# Patient Record
Sex: MALE | Race: WHITE | Employment: FULL TIME | URBAN - METROPOLITAN AREA
[De-identification: names, ages, dates, MRNs, and addresses within clinical notes are randomized per-mention and may not be internally consistent; named-entity substitution may affect disease eponyms.]

---

## 2017-01-03 ENCOUNTER — HOSPITAL ENCOUNTER (EMERGENCY)
Age: 47
Discharge: HOME | End: 2017-01-03
Attending: EMERGENCY MEDICINE
Payer: COMMERCIAL

## 2017-01-03 VITALS
OXYGEN SATURATION: 97 % | WEIGHT: 260 LBS | HEIGHT: 70 IN | RESPIRATION RATE: 20 BRPM | BODY MASS INDEX: 37.22 KG/M2 | DIASTOLIC BLOOD PRESSURE: 80 MMHG | HEART RATE: 65 BPM | TEMPERATURE: 97.9 F | SYSTOLIC BLOOD PRESSURE: 132 MMHG

## 2017-01-03 DIAGNOSIS — J01.10 ACUTE NON-RECURRENT FRONTAL SINUSITIS: ICD-10-CM

## 2017-01-03 DIAGNOSIS — H60.391 INFECTIVE OTITIS EXTERNA, RIGHT: Primary | ICD-10-CM

## 2017-01-03 PROCEDURE — 99283 EMERGENCY DEPT VISIT LOW MDM: CPT

## 2017-01-03 RX ORDER — CIPROFLOXACIN AND DEXAMETHASONE 3; 1 MG/ML; MG/ML
4 SUSPENSION/ DROPS AURICULAR (OTIC) 2 TIMES DAILY
Qty: 1 BOTTLE | Refills: 0 | Status: SHIPPED | OUTPATIENT
Start: 2017-01-03 | End: 2017-01-10

## 2017-01-03 RX ORDER — AZITHROMYCIN 250 MG/1
TABLET, FILM COATED ORAL
Qty: 6 TABLET | Refills: 0 | Status: SHIPPED | OUTPATIENT
Start: 2017-01-03 | End: 2017-01-13

## 2017-01-03 RX ORDER — HYDROCODONE BITARTRATE AND ACETAMINOPHEN 5; 325 MG/1; MG/1
1 TABLET ORAL EVERY 6 HOURS PRN
Qty: 15 TABLET | Refills: 0 | Status: SHIPPED | OUTPATIENT
Start: 2017-01-03 | End: 2017-03-26 | Stop reason: ALTCHOICE

## 2017-01-03 ASSESSMENT — ENCOUNTER SYMPTOMS
CHEST TIGHTNESS: 0
CHOKING: 0
WHEEZING: 0
ABDOMINAL PAIN: 0
STRIDOR: 0
BLOOD IN STOOL: 0
CONSTIPATION: 0
RHINORRHEA: 1
DIARRHEA: 0
BACK PAIN: 0
COUGH: 0
SHORTNESS OF BREATH: 0
EYE REDNESS: 0
SORE THROAT: 0
VOMITING: 0
FACIAL SWELLING: 0
SINUS PRESSURE: 0
TROUBLE SWALLOWING: 0
EYE DISCHARGE: 0
VOICE CHANGE: 0
EYE PAIN: 0

## 2017-01-03 ASSESSMENT — PAIN SCALES - GENERAL: PAINLEVEL_OUTOF10: 8

## 2017-01-03 ASSESSMENT — PAIN DESCRIPTION - FREQUENCY: FREQUENCY: CONTINUOUS

## 2017-01-03 ASSESSMENT — PAIN DESCRIPTION - ORIENTATION: ORIENTATION: OTHER (COMMENT)

## 2017-01-03 ASSESSMENT — PAIN DESCRIPTION - DESCRIPTORS: DESCRIPTORS: SHARP;THROBBING

## 2017-01-03 ASSESSMENT — PAIN DESCRIPTION - LOCATION: LOCATION: EAR

## 2017-03-26 ENCOUNTER — APPOINTMENT (OUTPATIENT)
Dept: GENERAL RADIOLOGY | Age: 47
End: 2017-03-26
Payer: COMMERCIAL

## 2017-03-26 ENCOUNTER — HOSPITAL ENCOUNTER (EMERGENCY)
Age: 47
Discharge: HOME | End: 2017-03-26
Attending: EMERGENCY MEDICINE
Payer: COMMERCIAL

## 2017-03-26 VITALS
HEART RATE: 70 BPM | RESPIRATION RATE: 17 BRPM | TEMPERATURE: 97.9 F | OXYGEN SATURATION: 96 % | HEIGHT: 70 IN | WEIGHT: 245 LBS | DIASTOLIC BLOOD PRESSURE: 71 MMHG | SYSTOLIC BLOOD PRESSURE: 121 MMHG | BODY MASS INDEX: 35.07 KG/M2

## 2017-03-26 DIAGNOSIS — S46.912A SHOULDER STRAIN, LEFT, INITIAL ENCOUNTER: Primary | ICD-10-CM

## 2017-03-26 PROCEDURE — 6370000000 HC RX 637 (ALT 250 FOR IP): Performed by: EMERGENCY MEDICINE

## 2017-03-26 PROCEDURE — 99283 EMERGENCY DEPT VISIT LOW MDM: CPT

## 2017-03-26 PROCEDURE — 73030 X-RAY EXAM OF SHOULDER: CPT

## 2017-03-26 RX ORDER — LEVOTHYROXINE SODIUM 0.2 MG/1
275 TABLET ORAL DAILY
COMMUNITY

## 2017-03-26 RX ORDER — ALBUTEROL SULFATE 90 UG/1
2 AEROSOL, METERED RESPIRATORY (INHALATION) EVERY 6 HOURS PRN
COMMUNITY

## 2017-03-26 RX ORDER — HYDROCODONE BITARTRATE AND ACETAMINOPHEN 5; 325 MG/1; MG/1
1 TABLET ORAL ONCE
Status: COMPLETED | OUTPATIENT
Start: 2017-03-26 | End: 2017-03-26

## 2017-03-26 RX ORDER — ACETAMINOPHEN 500 MG
500 TABLET ORAL EVERY 6 HOURS PRN
COMMUNITY

## 2017-03-26 RX ORDER — TRAMADOL HYDROCHLORIDE 50 MG/1
50 TABLET ORAL EVERY 6 HOURS PRN
Qty: 12 TABLET | Refills: 0 | Status: SHIPPED | OUTPATIENT
Start: 2017-03-26

## 2017-03-26 RX ADMIN — HYDROCODONE BITARTATE AND ACETAMINOPHEN 1 TABLET: 5; 325 TABLET ORAL at 11:44

## 2017-03-26 ASSESSMENT — PAIN DESCRIPTION - DESCRIPTORS
DESCRIPTORS: ACHING
DESCRIPTORS: ACHING
DESCRIPTORS: ACHING;SORE;SHARP

## 2017-03-26 ASSESSMENT — PAIN DESCRIPTION - LOCATION
LOCATION: SHOULDER

## 2017-03-26 ASSESSMENT — PAIN SCALES - GENERAL
PAINLEVEL_OUTOF10: 7
PAINLEVEL_OUTOF10: 4
PAINLEVEL_OUTOF10: 4
PAINLEVEL_OUTOF10: 8

## 2017-03-26 ASSESSMENT — ENCOUNTER SYMPTOMS
ABDOMINAL PAIN: 0
BACK PAIN: 0
NAUSEA: 0
CHEST TIGHTNESS: 0
VOMITING: 0
RHINORRHEA: 0
TROUBLE SWALLOWING: 0
BLOOD IN STOOL: 0
WHEEZING: 0
COUGH: 0
DIARRHEA: 0
SHORTNESS OF BREATH: 0
EYE PAIN: 0

## 2017-03-26 ASSESSMENT — PAIN DESCRIPTION - ORIENTATION
ORIENTATION: LEFT
ORIENTATION: LEFT

## 2017-03-26 ASSESSMENT — PAIN DESCRIPTION - FREQUENCY
FREQUENCY: INTERMITTENT
FREQUENCY: CONTINUOUS
FREQUENCY: INTERMITTENT

## 2017-03-26 ASSESSMENT — PAIN DESCRIPTION - PROGRESSION: CLINICAL_PROGRESSION: GRADUALLY WORSENING

## 2017-03-26 ASSESSMENT — PAIN DESCRIPTION - PAIN TYPE: TYPE: ACUTE PAIN

## 2017-04-15 ENCOUNTER — HOSPITAL ENCOUNTER (OUTPATIENT)
Dept: MRI IMAGING | Age: 47
Discharge: HOME | End: 2017-04-15
Payer: COMMERCIAL

## 2017-04-15 DIAGNOSIS — R52 PAIN: ICD-10-CM

## 2017-04-15 PROCEDURE — 73221 MRI JOINT UPR EXTREM W/O DYE: CPT

## 2023-02-13 PROBLEM — N32.81 OVERACTIVE BLADDER: Status: ACTIVE | Noted: 2023-02-13

## 2023-02-13 PROBLEM — E78.5 HYPERLIPIDEMIA: Status: ACTIVE | Noted: 2023-02-13

## 2023-02-13 PROBLEM — R94.30 ABNORMAL RESULTS OF CARDIOVASCULAR FUNCTION STUDIES: Status: RESOLVED | Noted: 2023-02-13 | Resolved: 2023-02-13

## 2023-02-13 PROBLEM — G47.33 OBSTRUCTIVE SLEEP APNEA, ADULT: Status: ACTIVE | Noted: 2023-02-13

## 2023-02-13 PROBLEM — N20.0 RENAL CALCULUS, LEFT: Status: ACTIVE | Noted: 2023-02-13

## 2023-02-13 PROBLEM — I25.10 CAD (CORONARY ARTERY DISEASE): Status: ACTIVE | Noted: 2023-02-13

## 2023-02-13 PROBLEM — E03.9 HYPOTHYROID: Status: ACTIVE | Noted: 2023-02-13

## 2023-02-13 PROBLEM — K59.00 CONSTIPATION, ACUTE: Status: ACTIVE | Noted: 2023-02-13

## 2023-02-13 PROBLEM — Z98.61 CAD S/P PERCUTANEOUS CORONARY ANGIOPLASTY: Status: ACTIVE | Noted: 2023-02-13

## 2023-02-13 PROBLEM — L84 FOOT CALLUS: Status: ACTIVE | Noted: 2023-02-13

## 2023-02-13 PROBLEM — J30.9 ALLERGIC RHINITIS: Status: ACTIVE | Noted: 2023-02-13

## 2023-02-13 PROBLEM — R93.1 ABNORMAL ECHOCARDIOGRAM: Status: ACTIVE | Noted: 2023-02-13

## 2023-02-13 PROBLEM — M89.9 OSTEOCHONDRAL LESION: Status: ACTIVE | Noted: 2023-02-13

## 2023-02-13 PROBLEM — R94.30 ABNORMAL RESULTS OF CARDIOVASCULAR FUNCTION STUDIES: Status: ACTIVE | Noted: 2023-02-13

## 2023-02-13 PROBLEM — M51.369 DISC DEGENERATION, LUMBAR: Status: ACTIVE | Noted: 2023-02-13

## 2023-02-13 PROBLEM — I10 ESSENTIAL HYPERTENSION: Status: ACTIVE | Noted: 2023-02-13

## 2023-02-13 PROBLEM — J44.9 COPD (CHRONIC OBSTRUCTIVE PULMONARY DISEASE) (MULTI): Status: ACTIVE | Noted: 2023-02-13

## 2023-02-13 PROBLEM — M77.11 LATERAL EPICONDYLITIS OF RIGHT ELBOW: Status: ACTIVE | Noted: 2023-02-13

## 2023-02-13 PROBLEM — M94.9 OSTEOCHONDRAL LESION: Status: ACTIVE | Noted: 2023-02-13

## 2023-02-13 PROBLEM — I42.9 CARDIOMYOPATHY (MULTI): Status: ACTIVE | Noted: 2023-02-13

## 2023-02-13 PROBLEM — G47.00 INSOMNIA: Status: ACTIVE | Noted: 2023-02-13

## 2023-02-13 PROBLEM — M51.36 DISC DEGENERATION, LUMBAR: Status: ACTIVE | Noted: 2023-02-13

## 2023-02-13 RX ORDER — MIRABEGRON 25 MG/1
25 TABLET, FILM COATED, EXTENDED RELEASE ORAL NIGHTLY
COMMUNITY
End: 2023-10-23 | Stop reason: ALTCHOICE

## 2023-02-13 RX ORDER — CLOPIDOGREL BISULFATE 75 MG/1
75 TABLET ORAL DAILY
COMMUNITY
End: 2024-01-23 | Stop reason: ALTCHOICE

## 2023-02-13 RX ORDER — NITROGLYCERIN 0.4 MG/1
TABLET SUBLINGUAL
COMMUNITY
Start: 2016-08-22

## 2023-02-13 RX ORDER — BUDESONIDE AND FORMOTEROL FUMARATE DIHYDRATE 160; 4.5 UG/1; UG/1
2 AEROSOL RESPIRATORY (INHALATION) 2 TIMES DAILY
COMMUNITY
Start: 2017-01-23 | End: 2023-11-13 | Stop reason: SDUPTHER

## 2023-02-13 RX ORDER — ASPIRIN 81 MG/1
1 TABLET ORAL DAILY
COMMUNITY
Start: 2020-06-08

## 2023-02-13 RX ORDER — LEVOTHYROXINE SODIUM 175 UG/1
1 TABLET ORAL DAILY
COMMUNITY
Start: 2017-01-23 | End: 2023-04-11

## 2023-02-13 RX ORDER — ALBUTEROL SULFATE 90 UG/1
1-2 AEROSOL, METERED RESPIRATORY (INHALATION) 4 TIMES DAILY
COMMUNITY
Start: 2020-07-07 | End: 2023-11-13 | Stop reason: SDUPTHER

## 2023-02-13 RX ORDER — VALSARTAN 80 MG/1
1 TABLET ORAL DAILY
COMMUNITY
Start: 2021-05-27 | End: 2023-04-11 | Stop reason: SDUPTHER

## 2023-02-13 RX ORDER — ISOSORBIDE DINITRATE 30 MG/1
30 TABLET ORAL DAILY
COMMUNITY
End: 2023-10-23 | Stop reason: ALTCHOICE

## 2023-02-13 RX ORDER — CARVEDILOL 12.5 MG/1
12.5 TABLET ORAL 2 TIMES DAILY
COMMUNITY
End: 2023-07-11 | Stop reason: SDUPTHER

## 2023-02-13 RX ORDER — ATORVASTATIN CALCIUM 80 MG/1
1 TABLET, FILM COATED ORAL NIGHTLY
COMMUNITY
Start: 2020-07-07 | End: 2023-11-13 | Stop reason: SDUPTHER

## 2023-03-24 ENCOUNTER — APPOINTMENT (OUTPATIENT)
Dept: PRIMARY CARE | Facility: CLINIC | Age: 53
End: 2023-03-24
Payer: COMMERCIAL

## 2023-04-11 ENCOUNTER — OFFICE VISIT (OUTPATIENT)
Dept: PRIMARY CARE | Facility: CLINIC | Age: 53
End: 2023-04-11
Payer: COMMERCIAL

## 2023-04-11 VITALS
DIASTOLIC BLOOD PRESSURE: 91 MMHG | TEMPERATURE: 97.7 F | SYSTOLIC BLOOD PRESSURE: 145 MMHG | BODY MASS INDEX: 32.44 KG/M2 | WEIGHT: 219 LBS | HEART RATE: 78 BPM | HEIGHT: 69 IN

## 2023-04-11 DIAGNOSIS — E03.9 ACQUIRED HYPOTHYROIDISM: ICD-10-CM

## 2023-04-11 DIAGNOSIS — J43.2 CENTRILOBULAR EMPHYSEMA (MULTI): ICD-10-CM

## 2023-04-11 DIAGNOSIS — I25.10 CAD S/P PERCUTANEOUS CORONARY ANGIOPLASTY: Primary | ICD-10-CM

## 2023-04-11 DIAGNOSIS — I10 ESSENTIAL HYPERTENSION: ICD-10-CM

## 2023-04-11 DIAGNOSIS — N40.0 BENIGN PROSTATIC HYPERPLASIA WITHOUT LOWER URINARY TRACT SYMPTOMS: ICD-10-CM

## 2023-04-11 DIAGNOSIS — E03.9 HYPOTHYROIDISM, UNSPECIFIED TYPE: ICD-10-CM

## 2023-04-11 DIAGNOSIS — I42.8 OTHER CARDIOMYOPATHY (MULTI): ICD-10-CM

## 2023-04-11 DIAGNOSIS — Z98.61 CAD S/P PERCUTANEOUS CORONARY ANGIOPLASTY: Primary | ICD-10-CM

## 2023-04-11 DIAGNOSIS — I71.21 ANEURYSM OF ASCENDING AORTA WITHOUT RUPTURE (CMS-HCC): ICD-10-CM

## 2023-04-11 PROBLEM — J30.9 ALLERGIC RHINITIS: Status: RESOLVED | Noted: 2023-02-13 | Resolved: 2023-04-11

## 2023-04-11 PROBLEM — L84 FOOT CALLUS: Status: RESOLVED | Noted: 2023-02-13 | Resolved: 2023-04-11

## 2023-04-11 PROBLEM — M94.9 OSTEOCHONDRAL LESION: Status: RESOLVED | Noted: 2023-02-13 | Resolved: 2023-04-11

## 2023-04-11 PROBLEM — M89.9 OSTEOCHONDRAL LESION: Status: RESOLVED | Noted: 2023-02-13 | Resolved: 2023-04-11

## 2023-04-11 PROBLEM — M77.11 LATERAL EPICONDYLITIS OF RIGHT ELBOW: Status: RESOLVED | Noted: 2023-02-13 | Resolved: 2023-04-11

## 2023-04-11 PROBLEM — R93.1 ABNORMAL ECHOCARDIOGRAM: Status: RESOLVED | Noted: 2023-02-13 | Resolved: 2023-04-11

## 2023-04-11 PROBLEM — K59.00 CONSTIPATION, ACUTE: Status: RESOLVED | Noted: 2023-02-13 | Resolved: 2023-04-11

## 2023-04-11 PROBLEM — N20.0 RENAL CALCULUS, LEFT: Status: RESOLVED | Noted: 2023-02-13 | Resolved: 2023-04-11

## 2023-04-11 PROCEDURE — 99213 OFFICE O/P EST LOW 20 MIN: CPT | Performed by: INTERNAL MEDICINE

## 2023-04-11 PROCEDURE — 3077F SYST BP >= 140 MM HG: CPT | Performed by: INTERNAL MEDICINE

## 2023-04-11 PROCEDURE — 1036F TOBACCO NON-USER: CPT | Performed by: INTERNAL MEDICINE

## 2023-04-11 PROCEDURE — 3080F DIAST BP >= 90 MM HG: CPT | Performed by: INTERNAL MEDICINE

## 2023-04-11 RX ORDER — LEVOTHYROXINE SODIUM 175 UG/1
TABLET ORAL
Qty: 90 TABLET | Refills: 0 | Status: SHIPPED | OUTPATIENT
Start: 2023-04-11 | End: 2023-08-14

## 2023-04-11 RX ORDER — VALSARTAN 160 MG/1
160 TABLET ORAL DAILY
Qty: 90 TABLET | Refills: 3 | Status: SHIPPED | OUTPATIENT
Start: 2023-04-11 | End: 2023-10-23 | Stop reason: SDUPTHER

## 2023-04-11 ASSESSMENT — ENCOUNTER SYMPTOMS
GASTROINTESTINAL NEGATIVE: 1
SHORTNESS OF BREATH: 1
BACK PAIN: 1
CONSTITUTIONAL NEGATIVE: 1
NEUROLOGICAL NEGATIVE: 1
EYES NEGATIVE: 1
HYPERTENSION: 1
ARTHRALGIAS: 1
BLURRED VISION: 0
SLEEP DISTURBANCE: 1

## 2023-04-11 NOTE — PROGRESS NOTES
"Subjective   Patient ID: Hira Mojica is a 53 y.o. male who presents for Follow-up.    Hypertension  This is a chronic problem. The current episode started more than 1 year ago. The problem is unchanged. The problem is uncontrolled. Associated symptoms include chest pain and shortness of breath. Pertinent negatives include no anxiety or blurred vision. Risk factors for coronary artery disease include male gender and stress. The current treatment provides mild improvement. There are no compliance problems.  Hypertensive end-organ damage includes CAD/MI and heart failure. There is no history of angina, kidney disease or left ventricular hypertrophy. There is no history of chronic renal disease.   Heart Problem  This is a chronic problem. The current episode started more than 1 year ago. The problem occurs intermittently. The problem has been unchanged. Associated symptoms include arthralgias and chest pain. The treatment provided moderate relief.        Review of Systems   Constitutional: Negative.    HENT: Negative.     Eyes: Negative.  Negative for blurred vision.   Respiratory:  Positive for shortness of breath.    Cardiovascular:  Positive for chest pain.   Gastrointestinal: Negative.    Musculoskeletal:  Positive for arthralgias and back pain.   Skin: Negative.    Neurological: Negative.    Psychiatric/Behavioral:  Positive for sleep disturbance.        Objective   BP (!) 145/91 (BP Location: Right arm, Patient Position: Sitting, BP Cuff Size: Adult)   Pulse 78   Temp 36.5 °C (97.7 °F) (Temporal)   Ht 1.74 m (5' 8.5\")   Wt 99.3 kg (219 lb)   BMI 32.81 kg/m²     Physical Exam  Vitals reviewed.   Constitutional:       Appearance: Normal appearance. He is normal weight.   HENT:      Head: Normocephalic.   Eyes:      Conjunctiva/sclera: Conjunctivae normal.   Cardiovascular:      Rate and Rhythm: Normal rate and regular rhythm.      Pulses: Normal pulses.   Pulmonary:      Effort: Pulmonary effort is " normal.      Breath sounds: Normal breath sounds.   Abdominal:      Palpations: Abdomen is soft.   Musculoskeletal:         General: Normal range of motion.      Cervical back: Normal range of motion.   Skin:     General: Skin is warm and dry.   Neurological:      General: No focal deficit present.   Psychiatric:         Mood and Affect: Mood normal.       Assessment/Plan   Problem List Items Addressed This Visit          Respiratory    COPD (chronic obstructive pulmonary disease) (CMS/HCC)       Circulatory    Essential hypertension    Cardiomyopathy (CMS/HCC)    CAD S/P percutaneous coronary angioplasty - Primary       Endocrine/Metabolic    Hypothyroid     Problem List Items Addressed This Visit          Respiratory    COPD (chronic obstructive pulmonary disease) (CMS/HCC)       Circulatory    Essential hypertension    Cardiomyopathy (CMS/HCC)    CAD S/P percutaneous coronary angioplasty - Primary    Aneurysm of ascending aorta without rupture (CMS/HCC)       Endocrine/Metabolic    Hypothyroid   Pt has moderate to severe COPD. It is progressive disease, use of MDI and proper technique discussed, rinse mouth after inhaled corticosteroids. Notify if progressive dyspnea or cough or lethargy, monitor oxygen saturation if possible with home based pulse oximetry. Avoid hospitalization and call us if any flare ups are about to happen, be sure that annual flu vaccine are uptodate and review need for pneumococcal vaccine. Light to moderate low impact exercises are very helpful and deep breathing  exercises are beneficial in chronic lung diseases.   Patients BP readings reviewed and addressed, as we age our arteries turn stiffer and less elastic. Restricting salt consumption and staying physically fit with regular exercise regimen is the only way to keep our vasculature less tonic. Studies have shown that keeping ideal body wt, exercise routine about 140 to 150 minutes a week, eating variety of plant based diet and  drinking plentiful water are quite helpful. Monitor BP twice or once a week at home and bring log to be reviewed by me. Uncontrolled BP has long term consequences including heart failure, myocardial infarction, accelerated atherosclerosis and kidney dysfunction. Therapy reviewed and explained.  Patient has a ER visit, they found that there was a ascending aortic aneurysm 4.2 cm in size, this Mikaela is surveillance, for that his BP readings has to be better, increase valsartan to 160 mg, lifestyle modification needs to be intense, recent echo was reviewed, ejection fraction remains low, no significantly dilated left atria seen, he is compliant with her CPAP mask, he is compliant with his oxygen use at night, his oxygenation needs to be continued on a nightly basis with a supplemental oxygenation, chronic pain persist, medications are reviewed, cardiology follow-up was reviewed, cystic emphysematous changes were seen, he is a former smoker, check TSH 6 time, I hope he is euthyroid clinically, check PSA next time, follow-up in 4 months.

## 2023-04-28 ENCOUNTER — LAB (OUTPATIENT)
Dept: LAB | Facility: LAB | Age: 53
End: 2023-04-28
Payer: COMMERCIAL

## 2023-04-28 DIAGNOSIS — N40.0 BENIGN PROSTATIC HYPERPLASIA WITHOUT LOWER URINARY TRACT SYMPTOMS: ICD-10-CM

## 2023-04-28 DIAGNOSIS — E03.9 ACQUIRED HYPOTHYROIDISM: ICD-10-CM

## 2023-04-28 LAB
PROSTATE SPECIFIC ANTIGEN,SCREEN: 0.37 NG/ML (ref 0–4)
THYROTROPIN (MIU/L) IN SER/PLAS BY DETECTION LIMIT <= 0.05 MIU/L: 7.79 MIU/L (ref 0.44–3.98)

## 2023-04-28 PROCEDURE — 84443 ASSAY THYROID STIM HORMONE: CPT

## 2023-04-28 PROCEDURE — 84153 ASSAY OF PSA TOTAL: CPT

## 2023-04-28 PROCEDURE — 36415 COLL VENOUS BLD VENIPUNCTURE: CPT

## 2023-06-26 LAB
ANION GAP IN SER/PLAS: 10 MMOL/L (ref 10–20)
CALCIUM (MG/DL) IN SER/PLAS: 9.3 MG/DL (ref 8.6–10.3)
CARBON DIOXIDE, TOTAL (MMOL/L) IN SER/PLAS: 28 MMOL/L (ref 21–32)
CHLORIDE (MMOL/L) IN SER/PLAS: 104 MMOL/L (ref 98–107)
CREATININE (MG/DL) IN SER/PLAS: 1.43 MG/DL (ref 0.5–1.3)
ERYTHROCYTE DISTRIBUTION WIDTH (RATIO) BY AUTOMATED COUNT: 13 % (ref 11.5–14.5)
ERYTHROCYTE MEAN CORPUSCULAR HEMOGLOBIN CONCENTRATION (G/DL) BY AUTOMATED: 33.8 G/DL (ref 32–36)
ERYTHROCYTE MEAN CORPUSCULAR VOLUME (FL) BY AUTOMATED COUNT: 106 FL (ref 80–100)
ERYTHROCYTES (10*6/UL) IN BLOOD BY AUTOMATED COUNT: 3.79 X10E12/L (ref 4.5–5.9)
GFR MALE: 59 ML/MIN/1.73M2
GLUCOSE (MG/DL) IN SER/PLAS: 65 MG/DL (ref 74–99)
HEMATOCRIT (%) IN BLOOD BY AUTOMATED COUNT: 40 % (ref 41–52)
HEMOGLOBIN (G/DL) IN BLOOD: 13.5 G/DL (ref 13.5–17.5)
INR IN PPP BY COAGULATION ASSAY: 1.2 (ref 0.9–1.1)
LEUKOCYTES (10*3/UL) IN BLOOD BY AUTOMATED COUNT: 10 X10E9/L (ref 4.4–11.3)
PLATELETS (10*3/UL) IN BLOOD AUTOMATED COUNT: 222 X10E9/L (ref 150–450)
POTASSIUM (MMOL/L) IN SER/PLAS: 4.2 MMOL/L (ref 3.5–5.3)
PROTHROMBIN TIME (PT) IN PPP BY COAGULATION ASSAY: 14 SEC (ref 9.8–12.8)
SODIUM (MMOL/L) IN SER/PLAS: 138 MMOL/L (ref 136–145)
UREA NITROGEN (MG/DL) IN SER/PLAS: 19 MG/DL (ref 6–23)

## 2023-06-29 ENCOUNTER — HOSPITAL ENCOUNTER (OUTPATIENT)
Dept: DATA CONVERSION | Facility: HOSPITAL | Age: 53
End: 2023-06-29
Attending: INTERNAL MEDICINE | Admitting: INTERNAL MEDICINE
Payer: COMMERCIAL

## 2023-06-29 DIAGNOSIS — I50.22 CHRONIC SYSTOLIC (CONGESTIVE) HEART FAILURE (MULTI): ICD-10-CM

## 2023-06-29 DIAGNOSIS — I11.0 HYPERTENSIVE HEART DISEASE WITH HEART FAILURE (MULTI): ICD-10-CM

## 2023-06-29 DIAGNOSIS — E07.9 DISORDER OF THYROID, UNSPECIFIED: ICD-10-CM

## 2023-06-29 DIAGNOSIS — I25.5 ISCHEMIC CARDIOMYOPATHY: ICD-10-CM

## 2023-06-29 DIAGNOSIS — I42.9 CARDIOMYOPATHY, UNSPECIFIED (MULTI): ICD-10-CM

## 2023-06-29 DIAGNOSIS — K21.9 GASTRO-ESOPHAGEAL REFLUX DISEASE WITHOUT ESOPHAGITIS: ICD-10-CM

## 2023-06-29 DIAGNOSIS — R00.1 BRADYCARDIA, UNSPECIFIED: ICD-10-CM

## 2023-06-29 DIAGNOSIS — J44.9 CHRONIC OBSTRUCTIVE PULMONARY DISEASE, UNSPECIFIED (MULTI): ICD-10-CM

## 2023-06-29 DIAGNOSIS — I25.2 OLD MYOCARDIAL INFARCTION: ICD-10-CM

## 2023-06-29 LAB
ATRIAL RATE: 48 BPM
ATRIAL RATE: 60 BPM
P AXIS: 34 DEGREES
P OFFSET: 124 MS
P OFFSET: 188 MS
P ONSET: 101 MS
P ONSET: 128 MS
PR INTERVAL: 170 MS
PR INTERVAL: 220 MS
Q ONSET: 213 MS
Q ONSET: 217 MS
QRS COUNT: 10 BEATS
QRS COUNT: 8 BEATS
QRS DURATION: 102 MS
QRS DURATION: 102 MS
QT INTERVAL: 420 MS
QT INTERVAL: 442 MS
QTC CALCULATION(BAZETT): 394 MS
QTC CALCULATION(BAZETT): 420 MS
QTC FREDERICIA: 410 MS
QTC FREDERICIA: 420 MS
R AXIS: 10 DEGREES
R AXIS: 37 DEGREES
T AXIS: 11 DEGREES
T AXIS: 9 DEGREES
T OFFSET: 427 MS
T OFFSET: 434 MS
VENTRICULAR RATE: 48 BPM
VENTRICULAR RATE: 60 BPM

## 2023-07-11 ENCOUNTER — OFFICE VISIT (OUTPATIENT)
Dept: PRIMARY CARE | Facility: CLINIC | Age: 53
End: 2023-07-11
Payer: COMMERCIAL

## 2023-07-11 VITALS
WEIGHT: 217 LBS | SYSTOLIC BLOOD PRESSURE: 134 MMHG | BODY MASS INDEX: 32.89 KG/M2 | HEIGHT: 68 IN | DIASTOLIC BLOOD PRESSURE: 90 MMHG | HEART RATE: 68 BPM | TEMPERATURE: 97.8 F

## 2023-07-11 DIAGNOSIS — I42.8 OTHER CARDIOMYOPATHY (MULTI): ICD-10-CM

## 2023-07-11 DIAGNOSIS — I10 ESSENTIAL HYPERTENSION: Primary | ICD-10-CM

## 2023-07-11 DIAGNOSIS — I25.10 CAD S/P PERCUTANEOUS CORONARY ANGIOPLASTY: ICD-10-CM

## 2023-07-11 DIAGNOSIS — G47.33 OBSTRUCTIVE SLEEP APNEA, ADULT: ICD-10-CM

## 2023-07-11 DIAGNOSIS — J43.2 CENTRILOBULAR EMPHYSEMA (MULTI): ICD-10-CM

## 2023-07-11 DIAGNOSIS — E03.9 ACQUIRED HYPOTHYROIDISM: ICD-10-CM

## 2023-07-11 DIAGNOSIS — Z98.61 CAD S/P PERCUTANEOUS CORONARY ANGIOPLASTY: ICD-10-CM

## 2023-07-11 PROCEDURE — 99213 OFFICE O/P EST LOW 20 MIN: CPT | Performed by: INTERNAL MEDICINE

## 2023-07-11 PROCEDURE — 3080F DIAST BP >= 90 MM HG: CPT | Performed by: INTERNAL MEDICINE

## 2023-07-11 PROCEDURE — 3075F SYST BP GE 130 - 139MM HG: CPT | Performed by: INTERNAL MEDICINE

## 2023-07-11 PROCEDURE — 1036F TOBACCO NON-USER: CPT | Performed by: INTERNAL MEDICINE

## 2023-07-11 RX ORDER — CARVEDILOL 12.5 MG/1
25 TABLET ORAL 2 TIMES DAILY
Qty: 180 TABLET | Refills: 3 | Status: SHIPPED | OUTPATIENT
Start: 2023-07-11 | End: 2023-11-09 | Stop reason: SDUPTHER

## 2023-07-11 ASSESSMENT — ENCOUNTER SYMPTOMS
CHEST PRESSURE: 0
FATIGUE: 0
EYES NEGATIVE: 1
DIAPHORESIS: 0
NEUROLOGICAL NEGATIVE: 1
CONSTITUTIONAL NEGATIVE: 1
HYPERTENSION: 1
HOARSE VOICE: 1
ABDOMINAL PAIN: 0
WEAKNESS: 0
ARTHRALGIAS: 1
CARDIOVASCULAR NEGATIVE: 1
ANOREXIA: 0
SHORTNESS OF BREATH: 0
EDEMA: 0
WEIGHT LOSS: 0
GASTROINTESTINAL NEGATIVE: 1
RESPIRATORY NEGATIVE: 1

## 2023-07-11 NOTE — PROGRESS NOTES
Subjective   Patient ID: Hira Mojica is a 53 y.o. male who presents for Follow-up (3 mo fu and med refill).    Heart Problem  This is a chronic problem. The current episode started more than 1 year ago. The problem occurs rarely. The problem has been resolved. Associated symptoms include arthralgias. Pertinent negatives include no abdominal pain, anorexia, chest pain, diaphoresis, fatigue or weakness. The treatment provided significant relief.   Congestive Heart Failure  Presents for follow-up visit. Pertinent negatives include no abdominal pain, chest pain, chest pressure, edema, fatigue or shortness of breath. The symptoms have been improving. Compliance with total regimen is %. Compliance problems include adherence to diet.    Thyroid Problem  Presents for follow-up visit. Symptoms include hoarse voice. Patient reports no cold intolerance, diaphoresis, fatigue or weight loss. The symptoms have been stable.   Hypertension  This is a chronic problem. The current episode started more than 1 year ago. The problem is unchanged. The problem is uncontrolled. Pertinent negatives include no anxiety, chest pain or shortness of breath. The current treatment provides mild improvement. Hypertensive end-organ damage includes CAD/MI. There is no history of angina. Identifiable causes of hypertension include sleep apnea and a thyroid problem.        Review of Systems   Constitutional: Negative.  Negative for diaphoresis, fatigue and weight loss.   HENT:  Positive for hoarse voice.    Eyes: Negative.    Respiratory: Negative.  Negative for shortness of breath.    Cardiovascular: Negative.  Negative for chest pain.   Gastrointestinal: Negative.  Negative for abdominal pain and anorexia.   Endocrine: Negative for cold intolerance.   Musculoskeletal:  Positive for arthralgias.   Skin: Negative.    Neurological: Negative.  Negative for weakness.       Objective   /90 (BP Location: Right arm, Patient Position:  "Sitting, BP Cuff Size: Adult)   Pulse 68   Temp 36.6 °C (97.8 °F) (Temporal)   Ht 1.734 m (5' 8.25\")   Wt 98.4 kg (217 lb)   BMI 32.75 kg/m²     Physical Exam  Vitals reviewed.   Constitutional:       Appearance: Normal appearance. He is obese.   HENT:      Head: Normocephalic and atraumatic.   Eyes:      Conjunctiva/sclera: Conjunctivae normal.   Cardiovascular:      Rate and Rhythm: Normal rate and regular rhythm.      Pulses: Normal pulses.   Pulmonary:      Effort: Pulmonary effort is normal.      Breath sounds: Normal breath sounds.   Chest:      Comments: ICD in place  Abdominal:      Palpations: Abdomen is soft.   Musculoskeletal:         General: Normal range of motion.      Cervical back: Normal range of motion.   Skin:     General: Skin is warm and dry.      Findings: Wound present.      Comments: Healed burn rt wrist area   Neurological:      General: No focal deficit present.   Psychiatric:         Mood and Affect: Mood normal.       Assessment/Plan   Problem List Items Addressed This Visit       COPD (chronic obstructive pulmonary disease) (CMS/[facility])    Essential hypertension - Primary    Obstructive sleep apnea, adult    Cardiomyopathy (CMS/[facility])    CAD S/P percutaneous coronary angioplasty   Pt does not have any angina lately, aware of using NTG if needed, aware to notify MD if any new chest pains happens. Compliance is appropriate. Statin therapy reviewed,    Pt has BHAVESH, usage and compliance of CPAP mask reviewed, pt was advised to use it and stay acclimatized to usage of CPAP mask, appropriate and proper sleep related hygiene reviewed and discussed, avoid sedatives and alcohol, avoid supine sleeping. Weight loo always helps to reduce risk and dangers of untreated sleep apnea. BHAVESH is wide spread and undiagnosed. If not able to tolerate mask then inform us and may try alternate masks or proper fitting masks or nasal pillows. Always have humidity for air flow.   Patient was evaluated today, problem " list was reviewed, problems and concerns addressed, Rx list reviewed and updated, lab and tests were noted and reviewed. Life style changes were discussed, always it works better if we eat plant based diet and plenty of fibres and roughage. Consume adequate amount of water and avoid alcohol, light to moderate physical activities and stress reduction are always beneficial for ongoing physical well being. Do not forget to have 6 to 7 hours of sleep regularly and avoid late night darryl screen exposure.  He has ICDDictation #1  MRN:[id number]  [facility]:[id number]    He has ICD placed, ICD wound looks well, he has a ejection fraction of 35% which required ICD and actually he was not aware about functions of ICD or why he has ICD so we went through that, he is compliant with CPAP mask, he is a former smoker, CPAP usage has been reviewed, he has obstructive disease in a moderate format, he has been having intermittent back pain, TSH was somewhat higher last time, TSH will be checked again, interestingly patient has not been any lipid levels done lately, he is on 80 mg of atorvastatin, no angina, he has a 5 PCI's, rest of medications are reviewed, lifestyle modifications, plenty of water consumption, low-fat low-cholesterol diet were reviewed and discussed, he has a burn on the right wrist, ER visit was reviewed about it, it is healing well, follow-up in 4 months.

## 2023-08-14 DIAGNOSIS — E03.9 HYPOTHYROIDISM, UNSPECIFIED TYPE: ICD-10-CM

## 2023-08-14 RX ORDER — LEVOTHYROXINE SODIUM 175 UG/1
TABLET ORAL
Qty: 90 TABLET | Refills: 0 | Status: SHIPPED | OUTPATIENT
Start: 2023-08-14 | End: 2023-11-13 | Stop reason: SDUPTHER

## 2023-08-30 PROBLEM — N45.1 EPIDIDYMITIS: Status: ACTIVE | Noted: 2023-08-30

## 2023-08-30 PROBLEM — Z91.199 NONCOMPLIANCE: Status: ACTIVE | Noted: 2023-08-30

## 2023-08-30 PROBLEM — R10.13 CHRONIC EPIGASTRIC PAIN: Status: ACTIVE | Noted: 2023-08-30

## 2023-08-30 PROBLEM — R93.1 ABNORMAL ECHOCARDIOGRAM: Status: ACTIVE | Noted: 2023-08-30

## 2023-08-30 PROBLEM — M54.10 RADICULITIS: Status: ACTIVE | Noted: 2023-08-30

## 2023-08-30 PROBLEM — M75.122 COMPLETE TEAR OF LEFT ROTATOR CUFF: Status: ACTIVE | Noted: 2023-08-30

## 2023-08-30 PROBLEM — I42.0 ISCHEMIC CONGESTIVE CARDIOMYOPATHY (MULTI): Status: ACTIVE | Noted: 2023-08-30

## 2023-08-30 PROBLEM — K46.9 ABDOMINAL HERNIA: Status: ACTIVE | Noted: 2023-08-30

## 2023-08-30 PROBLEM — N20.0 RENAL CALCULUS, LEFT: Status: ACTIVE | Noted: 2023-08-30

## 2023-08-30 PROBLEM — I25.5 ISCHEMIC CONGESTIVE CARDIOMYOPATHY (MULTI): Status: ACTIVE | Noted: 2023-08-30

## 2023-08-30 PROBLEM — M77.11 LATERAL EPICONDYLITIS OF RIGHT ELBOW: Status: ACTIVE | Noted: 2023-08-30

## 2023-08-30 PROBLEM — I20.9 ANGINA, CLASS IV (CMS-HCC): Status: ACTIVE | Noted: 2023-08-30

## 2023-08-30 PROBLEM — M94.9 OSTEOCHONDRAL LESION: Status: ACTIVE | Noted: 2023-08-30

## 2023-08-30 PROBLEM — N12 PYELONEPHRITIS: Status: ACTIVE | Noted: 2023-08-30

## 2023-08-30 PROBLEM — R06.02 SOB (SHORTNESS OF BREATH) ON EXERTION: Status: ACTIVE | Noted: 2023-08-30

## 2023-08-30 PROBLEM — N41.9 PROSTATITIS: Status: ACTIVE | Noted: 2023-08-30

## 2023-08-30 PROBLEM — R00.1 SINUS BRADYCARDIA: Status: ACTIVE | Noted: 2023-08-30

## 2023-08-30 PROBLEM — I25.2 OLD MI (MYOCARDIAL INFARCTION): Status: ACTIVE | Noted: 2023-08-30

## 2023-08-30 PROBLEM — I71.21 THORACIC ASCENDING AORTIC ANEURYSM (CMS-HCC): Status: ACTIVE | Noted: 2023-08-30

## 2023-08-30 PROBLEM — K57.32 DIVERTICULITIS OF SIGMOID COLON: Status: ACTIVE | Noted: 2023-08-30

## 2023-08-30 PROBLEM — G89.29 CHRONIC EPIGASTRIC PAIN: Status: ACTIVE | Noted: 2023-08-30

## 2023-08-30 PROBLEM — M10.9 GOUT: Status: ACTIVE | Noted: 2023-08-30

## 2023-08-30 PROBLEM — R22.31 LUMP OF RIGHT WRIST: Status: ACTIVE | Noted: 2023-08-30

## 2023-08-30 PROBLEM — H91.90 DECREASED HEARING: Status: ACTIVE | Noted: 2023-08-30

## 2023-08-30 PROBLEM — M89.9 OSTEOCHONDRAL LESION: Status: ACTIVE | Noted: 2023-08-30

## 2023-08-30 PROBLEM — Z95.810 CARDIAC DEFIBRILLATOR IN PLACE: Status: ACTIVE | Noted: 2023-08-30

## 2023-08-30 PROBLEM — R74.8 ELEVATED LIVER ENZYMES: Status: ACTIVE | Noted: 2023-08-30

## 2023-08-30 RX ORDER — FLUTICASONE PROPIONATE 50 UG/1
1 SPRAY, METERED NASAL DAILY
COMMUNITY
Start: 2023-02-22 | End: 2023-10-23 | Stop reason: ALTCHOICE

## 2023-08-30 RX ORDER — DOCUSATE SODIUM 100 MG/1
CAPSULE, LIQUID FILLED ORAL
COMMUNITY
Start: 2018-01-02 | End: 2023-10-23 | Stop reason: ALTCHOICE

## 2023-08-30 RX ORDER — IBUPROFEN 800 MG/1
800 TABLET ORAL EVERY 6 HOURS PRN
COMMUNITY
End: 2023-10-23 | Stop reason: ALTCHOICE

## 2023-08-30 RX ORDER — ACETAMINOPHEN 325 MG/1
2 TABLET ORAL EVERY 4 HOURS PRN
COMMUNITY
Start: 2023-06-29 | End: 2023-10-23 | Stop reason: ALTCHOICE

## 2023-08-30 RX ORDER — ISOSORBIDE MONONITRATE 30 MG/1
1 TABLET, EXTENDED RELEASE ORAL DAILY
COMMUNITY
Start: 2018-01-24

## 2023-08-30 RX ORDER — VALSARTAN 80 MG/1
1 TABLET ORAL DAILY
COMMUNITY
Start: 2021-05-27 | End: 2023-10-23 | Stop reason: ALTCHOICE

## 2023-08-30 RX ORDER — ACETAMINOPHEN 500 MG
500 TABLET ORAL EVERY 6 HOURS PRN
COMMUNITY
End: 2023-10-23 | Stop reason: ALTCHOICE

## 2023-08-30 RX ORDER — IPRATROPIUM BROMIDE AND ALBUTEROL SULFATE 2.5; .5 MG/3ML; MG/3ML
SOLUTION RESPIRATORY (INHALATION)
COMMUNITY
Start: 2017-12-08 | End: 2024-01-23 | Stop reason: WASHOUT

## 2023-08-30 RX ORDER — ATENOLOL 50 MG/1
1 TABLET ORAL DAILY
COMMUNITY
Start: 2012-04-20 | End: 2023-10-23 | Stop reason: ALTCHOICE

## 2023-08-30 RX ORDER — LEVOTHYROXINE SODIUM 200 UG/1
1 TABLET ORAL DAILY
COMMUNITY
Start: 2012-04-20 | End: 2023-10-23 | Stop reason: ALTCHOICE

## 2023-08-30 RX ORDER — PHENAZOPYRIDINE HYDROCHLORIDE 200 MG/1
TABLET, FILM COATED ORAL
COMMUNITY
Start: 2018-01-26 | End: 2023-10-23 | Stop reason: ALTCHOICE

## 2023-08-30 RX ORDER — TRAMADOL HYDROCHLORIDE 50 MG/1
1 TABLET ORAL EVERY 6 HOURS PRN
COMMUNITY
Start: 2017-03-26 | End: 2023-10-23 | Stop reason: ALTCHOICE

## 2023-09-29 VITALS — WEIGHT: 216.05 LBS | HEIGHT: 68 IN | BODY MASS INDEX: 32.74 KG/M2

## 2023-09-30 NOTE — H&P
History of Present Illness:   History Present Illness:  Reason for surgery: cardiomyopathy, bradycardia   HPI:    He is here for ICD implant.  He has chronic systolic heart failure, cardiomyopathy, and bradycardia.      Shared decision making performed. [place] decision tool.  All questions answered.  He confirms consent for ICD implant.    Past Medical History:        Medical History:   SOB (shortness of breath) on exertion: Onset Date: 23-Nov-2022   HLD (hyperlipidemia):    HTN (hypertension):    CAD (coronary artery disease):     Allergies:        Allergies:  ·  Toradol : Other, Rash, Throat Swelling  ·  penicillin : Throat Swelling, Other, Rash    Home Medication Review:   Home Medications Reviewed: yes     Impression/Procedure:   ·  Impression and Planned Procedure: cardiomyopathy, bradycardia; for dual chamber ICD       ERAS (Enhanced Recovery After Surgery):  ·  ERAS Patient: no     Review of Systems:   Review of Systems:  Constitutional: NEGATIVE: Fever, Chills     Respiratory: NEGATIVE: Dry Cough, Productive Cough     Cardiac: POSITIVE: Dyspnea on Exertion; NEGATIVE:  Chest Pain, Orthopnea, Palpitations, Syncope     All Other Systems: All other systems reviewed and  are negative       Physical Exam Narrative:  ·  Physical Exam:    VS noted    Physical Exam by System:    Constitutional: Well developed, awake/alert/oriented  x3, no distress, alert and cooperative   Eyes: EOMI, clear sclera   ENMT: mucous membranes moist, no apparent injury,  no lesions seen   Head/Neck: Neck supple, no thyromegaly, No JVD, trachea  midline   Respiratory/Thorax: Patent airways, CTA bilaterally,  no wheezing, normal breath sounds with good chest expansion, thorax symmetric   Cardiovascular: Laterally displaced PMI, regular,  rate and rhythm, , normal S 1and S 2, no murmurs, 2+ equal pulses radial, brachial, DP pulses   Gastrointestinal: Nondistended, BS +,  no bruits,  soft, non-tender, no guarding, no masses palpable, no  organomegaly   Genitourinary: deferred   Musculoskeletal: ROM intact, no joint swelling, normal  strength   Extremities: normal extremities; no cyanosis, edema,  or contusions, no clubbing   Neurological: alert and oriented x3, intact senses,  motor normal strength; normal gait   Breast: deferred   Lymphatic: No cervical lymphadenopathy   Psychological: Appropriate mood and behavior   Skin: Warm and dry, no lesions, no rashes     Airway/Sedation Assessment:  ·  Emotional Status calm   ·  Neurologic alert & oriented x 3   ·  Respiratory clear to auscultation   ·  Cardiovascular rhythm & rate regular  laterally displaced PMI   ·  GI/ soft, nontender     · Pulses present: Pedal Left, Pedal Right, Radial Left, Radial Right     ·  Mouth Opening OK yes   ·  Neck Flexibility OK yes   ·  Loose Teeth no   ·  Oropharyngeal Classification Class II   ·  ASA PS Classification ASA III   ·  Sedation Plan moderate sedation     Consent:   COVID-19 Consent:  ·  COVID-19 Risk Consent Surgeon has reviewed key risks related to the risk of brett COVID-19 and if they contract COVID-19 what the risks are.       Electronic Signatures:  Lisa Hurst)  (Signed 29-Jun-2023 09:14)   Authored: History of Present Illness, Past Medical History,  Allergies, Home Medication Review, Impression/Procedure, ERAS, Review of Systems, Physical Exam, Consent, Note Completion      Last Updated: 29-Jun-2023 09:14 by Lisa Hurst)

## 2023-10-06 ENCOUNTER — APPOINTMENT (OUTPATIENT)
Dept: CARDIOLOGY | Facility: HOSPITAL | Age: 53
End: 2023-10-06
Payer: COMMERCIAL

## 2023-10-23 ENCOUNTER — OFFICE VISIT (OUTPATIENT)
Dept: CARDIOLOGY | Facility: CLINIC | Age: 53
End: 2023-10-23
Payer: COMMERCIAL

## 2023-10-23 ENCOUNTER — HOSPITAL ENCOUNTER (OUTPATIENT)
Dept: RADIOLOGY | Facility: HOSPITAL | Age: 53
Discharge: HOME | End: 2023-10-23
Payer: COMMERCIAL

## 2023-10-23 ENCOUNTER — HOSPITAL ENCOUNTER (OUTPATIENT)
Dept: CARDIOLOGY | Facility: HOSPITAL | Age: 53
Discharge: HOME | End: 2023-10-23
Payer: COMMERCIAL

## 2023-10-23 VITALS
WEIGHT: 220 LBS | BODY MASS INDEX: 31.5 KG/M2 | HEIGHT: 70 IN | DIASTOLIC BLOOD PRESSURE: 64 MMHG | HEART RATE: 60 BPM | SYSTOLIC BLOOD PRESSURE: 102 MMHG

## 2023-10-23 DIAGNOSIS — Z98.61 CAD S/P PERCUTANEOUS CORONARY ANGIOPLASTY: ICD-10-CM

## 2023-10-23 DIAGNOSIS — E78.2 MIXED HYPERLIPIDEMIA: ICD-10-CM

## 2023-10-23 DIAGNOSIS — J43.2 CENTRILOBULAR EMPHYSEMA (MULTI): ICD-10-CM

## 2023-10-23 DIAGNOSIS — I47.29 PAROXYSMAL VENTRICULAR TACHYCARDIA (MULTI): ICD-10-CM

## 2023-10-23 DIAGNOSIS — Z95.810 PRESENCE OF AUTOMATIC CARDIOVERTER/DEFIBRILLATOR (AICD): ICD-10-CM

## 2023-10-23 DIAGNOSIS — Z95.810 PRESENCE OF AUTOMATIC (IMPLANTABLE) CARDIAC DEFIBRILLATOR: ICD-10-CM

## 2023-10-23 DIAGNOSIS — G47.33 OBSTRUCTIVE SLEEP APNEA, ADULT: ICD-10-CM

## 2023-10-23 DIAGNOSIS — Z95.810 CARDIAC DEFIBRILLATOR IN PLACE: Primary | ICD-10-CM

## 2023-10-23 DIAGNOSIS — I42.8 OTHER CARDIOMYOPATHY (MULTI): ICD-10-CM

## 2023-10-23 DIAGNOSIS — I42.0 ISCHEMIC CONGESTIVE CARDIOMYOPATHY (MULTI): ICD-10-CM

## 2023-10-23 DIAGNOSIS — I71.21 ANEURYSM OF ASCENDING AORTA WITHOUT RUPTURE (CMS-HCC): ICD-10-CM

## 2023-10-23 DIAGNOSIS — E03.9 ACQUIRED HYPOTHYROIDISM: ICD-10-CM

## 2023-10-23 DIAGNOSIS — I42.9 CARDIOMYOPATHY, UNSPECIFIED (MULTI): ICD-10-CM

## 2023-10-23 DIAGNOSIS — I10 ESSENTIAL HYPERTENSION: ICD-10-CM

## 2023-10-23 DIAGNOSIS — R06.02 SOB (SHORTNESS OF BREATH) ON EXERTION: ICD-10-CM

## 2023-10-23 DIAGNOSIS — I25.10 CAD S/P PERCUTANEOUS CORONARY ANGIOPLASTY: ICD-10-CM

## 2023-10-23 DIAGNOSIS — I25.5 ISCHEMIC CONGESTIVE CARDIOMYOPATHY (MULTI): ICD-10-CM

## 2023-10-23 PROCEDURE — 3078F DIAST BP <80 MM HG: CPT | Performed by: NURSE PRACTITIONER

## 2023-10-23 PROCEDURE — 99214 OFFICE O/P EST MOD 30 MIN: CPT | Performed by: NURSE PRACTITIONER

## 2023-10-23 PROCEDURE — 3074F SYST BP LT 130 MM HG: CPT | Performed by: NURSE PRACTITIONER

## 2023-10-23 PROCEDURE — 93290 INTERROG DEV EVAL ICPMS IP: CPT

## 2023-10-23 PROCEDURE — 93283 PRGRMG EVAL IMPLANTABLE DFB: CPT | Performed by: INTERNAL MEDICINE

## 2023-10-23 PROCEDURE — 1036F TOBACCO NON-USER: CPT | Performed by: NURSE PRACTITIONER

## 2023-10-23 PROCEDURE — 71046 X-RAY EXAM CHEST 2 VIEWS: CPT | Mod: FY

## 2023-10-23 PROCEDURE — 71046 X-RAY EXAM CHEST 2 VIEWS: CPT | Performed by: RADIOLOGY

## 2023-10-23 RX ORDER — VALSARTAN 160 MG/1
160 TABLET ORAL DAILY
Qty: 140 TABLET | Refills: 3 | Status: SHIPPED | OUTPATIENT
Start: 2023-10-23 | End: 2023-11-13 | Stop reason: SDUPTHER

## 2023-10-23 NOTE — PROGRESS NOTES
Electric blanket he states that over amplifies anything that got a magnetic field he went keep in arms length away device in the microwave to turn the microwave on one half CARDIOLOGY OFFICE VISIT      CHIEF COMPLAINT  Chief Complaint   Patient presents with    Device Check     Routine check up       HISTORY OF PRESENT ILLNESS  HPI  The patient is a 53-year-old  male who is followed for ischemic cardiomyopathy with a left ventricular ejection fraction of 30 to 35% per 2D echocardiogram dated April 3, 2023, New York Heart Association class II, stage B heart failure, coronary artery disease status post remote multivessel angioplasty with stent deployment with left heart catheterization dated January 16, 2023 revealing luminal irregularities of the LAD and circumflex with patent stents in both vessels and 80% proximal RCA stenosis status post PTCA with drug-eluting stent.  He underwent implantation of a dual-chamber ICD on June 29, 2023 for primary prevention of sudden cardiac death and presents to the office today for evaluation.  He states since undergoing ICD implant he is less short of breath.  He still occasionally experiences chest pressure with activity and has taken nitroglycerin 1 tablet sublingually on 2 occasions with relief of chest pain.  The patient reports that his blood pressures are not well controlled with home readings as high as 180/100 mmHg.  He reports that he is taking his medications as prescribed.  He states he occasionally has swelling in his feet and ankles which is resolved with elevating the extremities.  Review of medical records reveals the patient was evaluated in the emergency room at [facility] on August 1, 2023 for abdominal pain.  CT scan revealed sigmoid diverticulitis.  Most recently he was evaluated on September 29, 2023 for right ankle pain which is resolved.  Past Medical History  History reviewed. No pertinent past medical history.    Social  History  Social History     Tobacco Use    Smoking status: Former     Types: Cigarettes    Smokeless tobacco: Never   Vaping Use    Vaping Use: Never used   Substance Use Topics    Alcohol use: Never    Drug use: Never       Family History     Family History   Problem Relation Name Age of Onset    Other (arteriosclerotic CVD) Mother      Other (cardiac disorder) Mother      Stroke Mother      Cancer Mother      Colon cancer Mother          had colonectomy which seemed to stop the spread    Other (arteriosclerotic CVD) Father      Stroke Father      Cancer Father      Other (arteriosclerotic CVD) Brother      Other (cardiac disorder) Other grandmother     Colon cancer Other grandmother         was too late for any remedies and passed away within a month of dx    Cancer Other aunt         Allergies:  Allergies   Allergen Reactions    Penicillins Hives    Ketorolac Other     Adverse reaction        Outpatient Medications:  Current Outpatient Medications   Medication Instructions    albuterol 90 mcg/actuation inhaler 1-2 puffs, inhalation, 4 times daily    aspirin 81 mg EC tablet 1 tablet, oral, Daily    atorvastatin (Lipitor) 80 mg tablet 1 tablet, oral, Nightly    budesonide-formoteroL (Symbicort) 160-4.5 mcg/actuation inhaler 2 puffs, inhalation, 2 times daily, RINSE MOUTH AFTER USE.    carvedilol (COREG) 25 mg, oral, 2 times daily    clopidogrel (PLAVIX) 75 mg, oral, Daily    ipratropium-albuteroL (Duo-Neb) 0.5-2.5 mg/3 mL nebulizer solution     isosorbide mononitrate ER (Imdur) 30 mg 24 hr tablet 1 tablet, oral, Daily, In morning    levothyroxine (Synthroid, Levoxyl) 175 mcg tablet Take 1 tablet by mouth once daily    nitroglycerin (Nitrostat) 0.4 mg SL tablet sublingual, PLACE 1 TABLET UNDER THE TONGUE EVERY 5 MINUTES FOR UP TO 3 DOSES AS NEEDED FOR CHEST PAIN.CALL 911 IF PAIN PERSISTS.    oxygen (O2) gas therapy 3 L NC at HS    valsartan (DIOVAN) 160 mg, oral, Daily          REVIEW OF SYSTEMS  Review of Systems    All other systems reviewed and are negative.        VITALS  Vitals:    10/23/23 1033   BP: 102/64   Pulse: 60       PHYSICAL EXAM  Vitals and nursing note reviewed.   Constitutional:       Appearance: Normal appearance.   HENT:      Head: Normocephalic.   Neck:      Vascular: No JVD.   Cardiovascular:      Rate and Rhythm: Normal rate and regular rhythm.      Pulses: Normal pulses.      Heart sounds: Normal heart sounds.   Pulmonary:      Effort: Pulmonary effort is normal.      Breath sounds: Normal breath sounds.   Abdominal:      General: Bowel sounds are normal.      Palpations: Abdomen is soft.   Musculoskeletal:         General: Normal range of motion.      Cervical back: Normal range of motion.   Skin:     General: Skin is warm and dry.  Left subclavian ICD pocket is well-healed without redness swelling or drainage.  Neurological:      General: No focal deficit present.      Mental Status: She is alert and oriented to person, place, and time.      Motor: Motor function is intact.   Psychiatric:         Attention and Perception: Attention and perception normal.         Mood and Affect: Mood and affect normal.         Speech: Speech normal.         Behavior: Behavior normal. Behavior is cooperative.         Thought Content: Thought content normal.         Cognition and Memory: Cognition and memory normal.        Labs and testing: Twelve-lead EKG reveals atrial pacing and ventricular tracking at 60 bpm.  QRS durations 102 ms,  ms, QTc 416 ms.  ICD interrogation dated October 23, 2023 reveals atrial pacing 43% and ventricular pacing 0.93%.  No AT or AF episodes were noted per device counters.  3 SVT and 24 nonsustained VT detections were noted.  2 intracardiac electrograms were provided of the nonsustained VT.  The electrograms reveal atrial tachycardia with one-to-one conduction with abrupt onset and offset.  The remaining 22 nonsustained detections were not available for review at the time of today's  office visit.  PA and lateral chest x-ray reveals good right atrial and right ventricular lead positions with no active lung disease.    ASSESSMENT AND PLAN    Clinical impressions:  1.  Ischemic cardiomyopathy with a left ventricular ejection fraction of 30 to 35% per 2D echocardiogram dated April 3, 2023, New York Heart Association class II, stage B heart failure.  2.  Coronary artery disease status post multivessel angioplasty with stent deployment with left heart catheterization dated January 16, 2023 revealing luminal irregularities of the LAD and circumflex with patent stents in the LAD and circumflex and 80% proximal RCA stenosis status post PTCA with drug-eluting stent.  3.  Dual-chamber ICD implant ([employer] Gall[employer] DR) on June 29, 2023 for primary prevention of sudden cardiac death.  4.  Hypertension, controlled with a blood pressure today of 102/64.  5.  Ascending aortic aneurysm undergoing ongoing monitoring.  6.  Mixed dyslipidemia on statin.  7.  Hypothyroidism on replacement therapy.  8.  Chronic obstructive pulmonary disease.  9.  Class I obesity with a BMI of 31.57.  10.  History of sigmoid diverticulitis.    Recommendations:  1.  Continue current medications as prescribed.  Patient was instructed to monitor his blood pressure twice a day and take an extra valsartan 160 mg for blood pressure greater than 140/80 daily as needed.  2.  Patient was instructed regarding dietary restrictions including salt restrictions to control blood pressure.  He was also encouraged to increase water consumption to 64 ounces per day.  We discussed refraining from canned or frozen vegetables and to rinse those vegetables prior to cooking to reduce the sodium content.  We also discussed refraining from fast foods and fried foods.  3.  Obtain ICD checks as scheduled.  The next check is a remote check on January 23, 2024.  The patient will undergo an in clinic check in 6 months prior to the office visit with   Aris.  4.  Follow-up in office with Dr. Hurst in 6 months or sooner if needed.  5.  The results of the PA and lateral chest x-ray were reviewed with the patient.  6.  Discussed routine device follow up is scheduled every 3-4 months.  Inclinic checks alternate with remote (home) checks.  Inclinic checks will be scheduled prior to the office visit with Electrophysiology.  7.  Instructed patient to always carry the device card in their wallet.  No wanding of the device at the airport or courthouse.  Do not carry cell phone in the shirt pocket on the side of the implant.  Utilize the ear on the opposite side of the device.  Refrain from environments with electromagnetic interference (ELE).  8.  Follow-up in office with Dr. Trimble on January 23, 2024 at 11 AM as scheduled or sooner if needed.    Evaluation and note by Britney Villanueva CNP  **Please excuse any errors in grammar or translation related to this dictation.  Voice recognition software was utilized to prepare this document.**

## 2023-10-23 NOTE — PATIENT INSTRUCTIONS
Check your blood pressure twice a day.  If BP is greater than 140/80 take an extra Valsartan 160mg.  Call Britney at [phone] in 2 weeks with the high, average and low readings.

## 2023-10-27 ENCOUNTER — HOSPITAL ENCOUNTER (OUTPATIENT)
Dept: CARDIOLOGY | Facility: HOSPITAL | Age: 53
Discharge: HOME | End: 2023-10-27
Payer: COMMERCIAL

## 2023-10-27 DIAGNOSIS — Z95.810 PRESENCE OF AUTOMATIC CARDIOVERTER/DEFIBRILLATOR (AICD): Primary | ICD-10-CM

## 2023-10-27 DIAGNOSIS — I47.29 PAROXYSMAL VENTRICULAR TACHYCARDIA (MULTI): ICD-10-CM

## 2023-10-27 DIAGNOSIS — Z95.810 PRESENCE OF AUTOMATIC CARDIOVERTER/DEFIBRILLATOR (AICD): ICD-10-CM

## 2023-10-30 ENCOUNTER — HOSPITAL ENCOUNTER (OUTPATIENT)
Dept: CARDIOLOGY | Facility: HOSPITAL | Age: 53
Discharge: HOME | End: 2023-10-30
Payer: COMMERCIAL

## 2023-10-30 DIAGNOSIS — Z95.810 PRESENCE OF AUTOMATIC CARDIOVERTER/DEFIBRILLATOR (AICD): ICD-10-CM

## 2023-10-30 DIAGNOSIS — I47.29 PAROXYSMAL VENTRICULAR TACHYCARDIA (MULTI): ICD-10-CM

## 2023-11-01 ENCOUNTER — TELEPHONE (OUTPATIENT)
Dept: CARDIOLOGY | Facility: CLINIC | Age: 53
End: 2023-11-01
Payer: COMMERCIAL

## 2023-11-01 NOTE — TELEPHONE ENCOUNTER
----- Message from Lisa Hurst MD sent at 10/25/2023  6:57 AM EDT -----  Regarding: FW:  Cxr reviewed.  Call pt that cxr is ok  ----- Message -----  From: Interface, Radiology Results In  Sent: 10/25/2023   1:57 AM EDT  To: Lisa Hurst MD

## 2023-11-02 ENCOUNTER — TELEPHONE (OUTPATIENT)
Dept: CARDIOLOGY | Facility: CLINIC | Age: 53
End: 2023-11-02
Payer: COMMERCIAL

## 2023-11-02 DIAGNOSIS — R00.2 PALPITATIONS: Primary | ICD-10-CM

## 2023-11-02 DIAGNOSIS — I47.29 PAROXYSMAL VENTRICULAR TACHYCARDIA (MULTI): ICD-10-CM

## 2023-11-02 NOTE — TELEPHONE ENCOUNTER
Pt left message that he missed a call from the office today.    Note in chart but no contents.      Please follow up.    Patient left a voice mail for Britney Villanueva CNP stating that when he takes his blood pressure, it has been showing some abnormal heart beats. Called and spoke to patient, he reports that he does get a little fluttering in his chest a couple of times weekly. Remote check obtained and reviewed by Britney Villanueva CNP.  Per Britney Villanueva CNP remote shows 1 NSVT , egm shows SVT. Per Britney Villanueva CNP  patient to add Mag-ox 400mg once daily to his medications. Patient to keep a record of the events that he sees on his blood pressure monitor with date and time along with any symptoms that he is having, so that his information can be compared to remote check. Patient verbalized understanding

## 2023-11-03 RX ORDER — CALCIUM CARBONATE/VITAMIN D3 500-10/5ML
400 LIQUID (ML) ORAL DAILY
Qty: 90 CAPSULE | Refills: 0 | Status: SHIPPED | OUTPATIENT
Start: 2023-11-03 | End: 2024-03-26 | Stop reason: SDUPTHER

## 2023-11-03 RX ORDER — LANOLIN ALCOHOL/MO/W.PET/CERES
400 CREAM (GRAM) TOPICAL ONCE
Status: CANCELLED | OUTPATIENT
Start: 2023-11-03 | End: 2023-11-03

## 2023-11-08 ENCOUNTER — TELEPHONE (OUTPATIENT)
Dept: CARDIOLOGY | Facility: CLINIC | Age: 53
End: 2023-11-08
Payer: COMMERCIAL

## 2023-11-08 ENCOUNTER — HOSPITAL ENCOUNTER (EMERGENCY)
Facility: HOSPITAL | Age: 53
Discharge: HOME | End: 2023-11-08
Attending: STUDENT IN AN ORGANIZED HEALTH CARE EDUCATION/TRAINING PROGRAM
Payer: COMMERCIAL

## 2023-11-08 ENCOUNTER — APPOINTMENT (OUTPATIENT)
Dept: CARDIOLOGY | Facility: HOSPITAL | Age: 53
End: 2023-11-08
Payer: COMMERCIAL

## 2023-11-08 VITALS
BODY MASS INDEX: 30.06 KG/M2 | WEIGHT: 210 LBS | RESPIRATION RATE: 18 BRPM | HEART RATE: 61 BPM | SYSTOLIC BLOOD PRESSURE: 150 MMHG | OXYGEN SATURATION: 98 % | HEIGHT: 70 IN | DIASTOLIC BLOOD PRESSURE: 95 MMHG | TEMPERATURE: 97.7 F

## 2023-11-08 DIAGNOSIS — I42.8 OTHER CARDIOMYOPATHY (MULTI): ICD-10-CM

## 2023-11-08 DIAGNOSIS — Z98.61 CAD S/P PERCUTANEOUS CORONARY ANGIOPLASTY: ICD-10-CM

## 2023-11-08 DIAGNOSIS — I15.9 SECONDARY HYPERTENSION: Primary | ICD-10-CM

## 2023-11-08 DIAGNOSIS — I25.10 CAD S/P PERCUTANEOUS CORONARY ANGIOPLASTY: ICD-10-CM

## 2023-11-08 LAB
ANION GAP SERPL CALC-SCNC: 9 MMOL/L (ref 10–20)
BASOPHILS # BLD AUTO: 0.07 X10*3/UL (ref 0–0.1)
BASOPHILS NFR BLD AUTO: 0.9 %
BNP SERPL-MCNC: 97 PG/ML (ref 0–99)
BUN SERPL-MCNC: 18 MG/DL (ref 6–23)
CALCIUM SERPL-MCNC: 9.4 MG/DL (ref 8.6–10.3)
CARDIAC TROPONIN I PNL SERPL HS: 13 NG/L (ref 0–20)
CHLORIDE SERPL-SCNC: 104 MMOL/L (ref 98–107)
CO2 SERPL-SCNC: 30 MMOL/L (ref 21–32)
CREAT SERPL-MCNC: 1.12 MG/DL (ref 0.5–1.3)
EOSINOPHIL # BLD AUTO: 0.22 X10*3/UL (ref 0–0.7)
EOSINOPHIL NFR BLD AUTO: 2.9 %
ERYTHROCYTE [DISTWIDTH] IN BLOOD BY AUTOMATED COUNT: 13.2 % (ref 11.5–14.5)
FLUAV RNA RESP QL NAA+PROBE: NOT DETECTED
FLUBV RNA RESP QL NAA+PROBE: NOT DETECTED
GFR SERPL CREATININE-BSD FRML MDRD: 79 ML/MIN/1.73M*2
GLUCOSE SERPL-MCNC: 84 MG/DL (ref 74–99)
HCT VFR BLD AUTO: 39.3 % (ref 41–52)
HGB BLD-MCNC: 13.4 G/DL (ref 13.5–17.5)
HOLD SPECIMEN: NORMAL
HOLD SPECIMEN: NORMAL
IMM GRANULOCYTES # BLD AUTO: 0.01 X10*3/UL (ref 0–0.7)
IMM GRANULOCYTES NFR BLD AUTO: 0.1 % (ref 0–0.9)
LYMPHOCYTES # BLD AUTO: 1.96 X10*3/UL (ref 1.2–4.8)
LYMPHOCYTES NFR BLD AUTO: 25.7 %
MAGNESIUM SERPL-MCNC: 2.06 MG/DL (ref 1.6–2.4)
MCH RBC QN AUTO: 34.6 PG (ref 26–34)
MCHC RBC AUTO-ENTMCNC: 34.1 G/DL (ref 32–36)
MCV RBC AUTO: 102 FL (ref 80–100)
MONOCYTES # BLD AUTO: 0.81 X10*3/UL (ref 0.1–1)
MONOCYTES NFR BLD AUTO: 10.6 %
NEUTROPHILS # BLD AUTO: 4.56 X10*3/UL (ref 1.2–7.7)
NEUTROPHILS NFR BLD AUTO: 59.8 %
NRBC BLD-RTO: 0 /100 WBCS (ref 0–0)
PLATELET # BLD AUTO: 192 X10*3/UL (ref 150–450)
POTASSIUM SERPL-SCNC: 4 MMOL/L (ref 3.5–5.3)
RBC # BLD AUTO: 3.87 X10*6/UL (ref 4.5–5.9)
SARS-COV-2 RNA RESP QL NAA+PROBE: NOT DETECTED
SODIUM SERPL-SCNC: 139 MMOL/L (ref 136–145)
WBC # BLD AUTO: 7.6 X10*3/UL (ref 4.4–11.3)

## 2023-11-08 PROCEDURE — 93005 ELECTROCARDIOGRAM TRACING: CPT

## 2023-11-08 PROCEDURE — 99285 EMERGENCY DEPT VISIT HI MDM: CPT | Mod: 25 | Performed by: STUDENT IN AN ORGANIZED HEALTH CARE EDUCATION/TRAINING PROGRAM

## 2023-11-08 PROCEDURE — 87636 SARSCOV2 & INF A&B AMP PRB: CPT | Performed by: STUDENT IN AN ORGANIZED HEALTH CARE EDUCATION/TRAINING PROGRAM

## 2023-11-08 PROCEDURE — 99284 EMERGENCY DEPT VISIT MOD MDM: CPT | Mod: 25

## 2023-11-08 PROCEDURE — 96375 TX/PRO/DX INJ NEW DRUG ADDON: CPT

## 2023-11-08 PROCEDURE — 36415 COLL VENOUS BLD VENIPUNCTURE: CPT | Performed by: STUDENT IN AN ORGANIZED HEALTH CARE EDUCATION/TRAINING PROGRAM

## 2023-11-08 PROCEDURE — 94760 N-INVAS EAR/PLS OXIMETRY 1: CPT

## 2023-11-08 PROCEDURE — 2500000004 HC RX 250 GENERAL PHARMACY W/ HCPCS (ALT 636 FOR OP/ED): Performed by: STUDENT IN AN ORGANIZED HEALTH CARE EDUCATION/TRAINING PROGRAM

## 2023-11-08 PROCEDURE — 84484 ASSAY OF TROPONIN QUANT: CPT | Performed by: STUDENT IN AN ORGANIZED HEALTH CARE EDUCATION/TRAINING PROGRAM

## 2023-11-08 PROCEDURE — 2500000001 HC RX 250 WO HCPCS SELF ADMINISTERED DRUGS (ALT 637 FOR MEDICARE OP): Performed by: STUDENT IN AN ORGANIZED HEALTH CARE EDUCATION/TRAINING PROGRAM

## 2023-11-08 PROCEDURE — 84132 ASSAY OF SERUM POTASSIUM: CPT | Performed by: STUDENT IN AN ORGANIZED HEALTH CARE EDUCATION/TRAINING PROGRAM

## 2023-11-08 PROCEDURE — 85025 COMPLETE CBC W/AUTO DIFF WBC: CPT | Performed by: STUDENT IN AN ORGANIZED HEALTH CARE EDUCATION/TRAINING PROGRAM

## 2023-11-08 PROCEDURE — 83880 ASSAY OF NATRIURETIC PEPTIDE: CPT | Performed by: STUDENT IN AN ORGANIZED HEALTH CARE EDUCATION/TRAINING PROGRAM

## 2023-11-08 PROCEDURE — 83735 ASSAY OF MAGNESIUM: CPT | Performed by: STUDENT IN AN ORGANIZED HEALTH CARE EDUCATION/TRAINING PROGRAM

## 2023-11-08 PROCEDURE — 96365 THER/PROPH/DIAG IV INF INIT: CPT

## 2023-11-08 RX ORDER — BUTALBITAL, ACETAMINOPHEN AND CAFFEINE 50; 325; 40 MG/1; MG/1; MG/1
2 TABLET ORAL ONCE
Status: COMPLETED | OUTPATIENT
Start: 2023-11-08 | End: 2023-11-08

## 2023-11-08 RX ORDER — PROMETHAZINE HYDROCHLORIDE 25 MG/ML
INJECTION, SOLUTION INTRAMUSCULAR; INTRAVENOUS
Status: DISCONTINUED
Start: 2023-11-08 | End: 2023-11-09 | Stop reason: HOSPADM

## 2023-11-08 RX ORDER — DIPHENHYDRAMINE HYDROCHLORIDE 50 MG/ML
25 INJECTION INTRAMUSCULAR; INTRAVENOUS ONCE
Status: COMPLETED | OUTPATIENT
Start: 2023-11-08 | End: 2023-11-08

## 2023-11-08 RX ADMIN — BUTALBITAL, ACETAMINOPHEN, AND CAFFEINE 2 TABLET: 50; 325; 40 TABLET ORAL at 19:40

## 2023-11-08 RX ADMIN — PROMETHAZINE HYDROCHLORIDE 25 MG: 25 INJECTION INTRAMUSCULAR; INTRAVENOUS at 19:00

## 2023-11-08 RX ADMIN — DIPHENHYDRAMINE HYDROCHLORIDE 25 MG: 50 INJECTION, SOLUTION INTRAMUSCULAR; INTRAVENOUS at 19:39

## 2023-11-08 ASSESSMENT — PAIN SCALES - GENERAL
PAINLEVEL_OUTOF10: 7
PAINLEVEL_OUTOF10: 6
PAINLEVEL_OUTOF10: 0 - NO PAIN
PAINLEVEL_OUTOF10: 2

## 2023-11-08 ASSESSMENT — LIFESTYLE VARIABLES
HAVE YOU EVER FELT YOU SHOULD CUT DOWN ON YOUR DRINKING: NO
HAVE PEOPLE ANNOYED YOU BY CRITICIZING YOUR DRINKING: NO
EVER FELT BAD OR GUILTY ABOUT YOUR DRINKING: NO
REASON UNABLE TO ASSESS: NO
EVER HAD A DRINK FIRST THING IN THE MORNING TO STEADY YOUR NERVES TO GET RID OF A HANGOVER: NO

## 2023-11-08 ASSESSMENT — PAIN DESCRIPTION - PROGRESSION
CLINICAL_PROGRESSION: NOT CHANGED
CLINICAL_PROGRESSION: NOT CHANGED

## 2023-11-08 ASSESSMENT — PAIN DESCRIPTION - LOCATION
LOCATION: HEAD
LOCATION: HEAD

## 2023-11-08 ASSESSMENT — PAIN DESCRIPTION - FREQUENCY: FREQUENCY: CONSTANT/CONTINUOUS

## 2023-11-08 ASSESSMENT — PAIN DESCRIPTION - DESCRIPTORS: DESCRIPTORS: ACHING

## 2023-11-08 ASSESSMENT — PAIN DESCRIPTION - ORIENTATION: ORIENTATION: RIGHT;LEFT

## 2023-11-08 ASSESSMENT — PAIN DESCRIPTION - PAIN TYPE: TYPE: ACUTE PAIN

## 2023-11-08 ASSESSMENT — PAIN - FUNCTIONAL ASSESSMENT
PAIN_FUNCTIONAL_ASSESSMENT: 0-10
PAIN_FUNCTIONAL_ASSESSMENT: 0-10

## 2023-11-08 NOTE — TELEPHONE ENCOUNTER
Pt called stating he has been having high BP readings. Pt gave readings of 169/170, 169/131, 151/131. I advised that the bottom number would not be higher than the top number and his machine may not be accurate. Pt does complain of a headache x 2 days and some aches and sweats which he states my be sinus related. Pt denies CP.   Medications reviewed and current  Pt asking for recommendations

## 2023-11-09 RX ORDER — CARVEDILOL 25 MG/1
25 TABLET ORAL 2 TIMES DAILY
Qty: 90 TABLET | Refills: 3 | Status: SHIPPED | OUTPATIENT
Start: 2023-11-09

## 2023-11-09 NOTE — ED PROVIDER NOTES
HPI   Chief Complaint   Patient presents with    Hypertension       Patient presents to the ER with complaints of headache and high blood pressure.  Stated the systolic blood pressure was up to 180.  He takes 2 medications for antihypertensive and is also on aspirin and Plavix previous history of coronary artery stents.  Has no complaint of any new chest pain or shortness of breath.  Denies nausea or vomiting.  Denies vision changes.  Denies abdominal pain.  No new back pain.  Denies hematuria.                          No data recorded                Patient History   No past medical history on file.  Past Surgical History:   Procedure Laterality Date    APPENDECTOMY  01/23/2017    Appendectomy    CHOLECYSTECTOMY  01/23/2017    Cholecystectomy    HERNIA REPAIR  01/23/2017    Inguinal Hernia Repair    HERNIA REPAIR  02/16/2017    Hernia Repair    OTHER SURGICAL HISTORY  05/10/2021    Testicular surgery    OTHER SURGICAL HISTORY  05/10/2021    Coronary artery stent placement    OTHER SURGICAL HISTORY  06/07/2021    Inguinal hernia repair    OTHER SURGICAL HISTORY  07/27/2022    Cardiac catheterization    OTHER SURGICAL HISTORY  07/27/2022    Colonoscopy    OTHER SURGICAL HISTORY  07/27/2022    Cyst excision    OTHER SURGICAL HISTORY  07/27/2022    Percutaneous transluminal coronary angioplasty    ROTATOR CUFF REPAIR  01/23/2017    Rotator Cuff Repair    TOTAL THYROIDECTOMY  01/23/2017    Thyroid Surgery Total Thyroidectomy     Family History   Problem Relation Name Age of Onset    Other (arteriosclerotic CVD) Mother      Other (cardiac disorder) Mother      Stroke Mother      Cancer Mother      Colon cancer Mother          had colonectomy which seemed to stop the spread    Other (arteriosclerotic CVD) Father      Stroke Father      Cancer Father      Other (arteriosclerotic CVD) Brother      Other (cardiac disorder) Other grandmother     Colon cancer Other grandmother         was too late for any remedies and passed  away within a month of dx    Cancer Other aunt      Social History     Tobacco Use    Smoking status: Former     Types: Cigarettes    Smokeless tobacco: Never   Vaping Use    Vaping Use: Never used   Substance Use Topics    Alcohol use: Never    Drug use: Never       Physical Exam   ED Triage Vitals   Temp Heart Rate Resp BP   11/08/23 1807 11/08/23 1807 11/08/23 1807 11/08/23 1807   36.5 °C (97.7 °F) 59 18 (!) 163/92      SpO2 Temp Source Heart Rate Source Patient Position   11/08/23 1807 11/08/23 1807 11/08/23 1807 11/08/23 2048   95 % Temporal Monitor Lying      BP Location FiO2 (%)     11/08/23 1807 --     Right arm        Physical Exam  Vitals and nursing note reviewed.   Constitutional:       General: He is not in acute distress.     Appearance: He is well-developed.      Comments: Resting comfortably in the bed   HENT:      Head: Normocephalic and atraumatic.      Comments: No carballo sign or raccoon eyes.  No signs of head injury no external signs of head injury  Eyes:      Conjunctiva/sclera: Conjunctivae normal.   Cardiovascular:      Rate and Rhythm: Normal rate and regular rhythm.      Heart sounds: No murmur heard.  Pulmonary:      Effort: Pulmonary effort is normal. No respiratory distress.      Breath sounds: Normal breath sounds.   Abdominal:      Palpations: Abdomen is soft.      Tenderness: There is no abdominal tenderness. There is no guarding or rebound.   Musculoskeletal:         General: No swelling.      Cervical back: Neck supple.      Comments: Minimal trace pitting edema in the bilateral lower extremities limited to the sock line.   Skin:     General: Skin is warm and dry.      Capillary Refill: Capillary refill takes less than 2 seconds.   Neurological:      General: No focal deficit present.      Mental Status: He is alert and oriented to person, place, and time. Mental status is at baseline.   Psychiatric:         Mood and Affect: Mood normal.         ED Course & MDM   Diagnoses as of  11/08/23 2158   Secondary hypertension       Medical Decision Making  EKG interpreted by myself shows a sinus rhythm heart rate 63, ,  QTc 423 with normal axis.  Occasional PVC noted.  No STEMI criteria noted    Cardiac enzymes unremarkable.  BMP unremarkable.  Renal function normal.  Hemoglobin 13 white blood cell count 7.6.  COVID and influenza negative.  Given migraine cocktail consisting of Fioricet, Benadryl and Phenergan with resolution of headache.  Repeat blood pressure is now downtrending at 140/96.  Likely, the blood pressure is elevated due to pain.  Plan for discharge at this time.      Procedure  Procedures     Marlon Meeks MD  11/08/23 2158

## 2023-11-09 NOTE — TELEPHONE ENCOUNTER
Per Dr. Manish Hall MD increase Coreg to 25 mg BID. I called and instructed patient. He verbalized understanding.

## 2023-11-13 ENCOUNTER — LAB (OUTPATIENT)
Dept: LAB | Facility: LAB | Age: 53
End: 2023-11-13
Payer: COMMERCIAL

## 2023-11-13 ENCOUNTER — OFFICE VISIT (OUTPATIENT)
Dept: PRIMARY CARE | Facility: CLINIC | Age: 53
End: 2023-11-13
Payer: COMMERCIAL

## 2023-11-13 ENCOUNTER — HOSPITAL ENCOUNTER (OUTPATIENT)
Dept: CARDIOLOGY | Facility: HOSPITAL | Age: 53
Discharge: HOME | End: 2023-11-13
Payer: COMMERCIAL

## 2023-11-13 VITALS
TEMPERATURE: 97.1 F | SYSTOLIC BLOOD PRESSURE: 150 MMHG | WEIGHT: 219 LBS | HEART RATE: 67 BPM | BODY MASS INDEX: 31.35 KG/M2 | HEIGHT: 70 IN | DIASTOLIC BLOOD PRESSURE: 100 MMHG

## 2023-11-13 DIAGNOSIS — Z95.810 PRESENCE OF AUTOMATIC CARDIOVERTER/DEFIBRILLATOR (AICD): ICD-10-CM

## 2023-11-13 DIAGNOSIS — E78.2 MIXED HYPERLIPIDEMIA: ICD-10-CM

## 2023-11-13 DIAGNOSIS — G47.33 OBSTRUCTIVE SLEEP APNEA, ADULT: ICD-10-CM

## 2023-11-13 DIAGNOSIS — E03.9 HYPOTHYROIDISM, UNSPECIFIED TYPE: ICD-10-CM

## 2023-11-13 DIAGNOSIS — I42.8 OTHER CARDIOMYOPATHY (MULTI): ICD-10-CM

## 2023-11-13 DIAGNOSIS — J43.2 CENTRILOBULAR EMPHYSEMA (MULTI): ICD-10-CM

## 2023-11-13 DIAGNOSIS — J01.00 SUBACUTE MAXILLARY SINUSITIS: Primary | ICD-10-CM

## 2023-11-13 DIAGNOSIS — I25.10 CAD S/P PERCUTANEOUS CORONARY ANGIOPLASTY: ICD-10-CM

## 2023-11-13 DIAGNOSIS — I10 ESSENTIAL HYPERTENSION: ICD-10-CM

## 2023-11-13 DIAGNOSIS — Z98.61 CAD S/P PERCUTANEOUS CORONARY ANGIOPLASTY: ICD-10-CM

## 2023-11-13 DIAGNOSIS — I47.29 PAROXYSMAL VENTRICULAR TACHYCARDIA (MULTI): ICD-10-CM

## 2023-11-13 DIAGNOSIS — E03.9 ACQUIRED HYPOTHYROIDISM: ICD-10-CM

## 2023-11-13 LAB
CHOLEST SERPL-MCNC: 218 MG/DL (ref 0–199)
CHOLESTEROL/HDL RATIO: 4.7
HDLC SERPL-MCNC: 46.1 MG/DL
LDLC SERPL CALC-MCNC: 156 MG/DL
NON HDL CHOLESTEROL: 172 MG/DL (ref 0–149)
TRIGL SERPL-MCNC: 80 MG/DL (ref 0–149)
TSH SERPL-ACNC: 6.36 MIU/L (ref 0.44–3.98)
VLDL: 16 MG/DL (ref 0–40)

## 2023-11-13 PROCEDURE — 3080F DIAST BP >= 90 MM HG: CPT | Performed by: INTERNAL MEDICINE

## 2023-11-13 PROCEDURE — 36415 COLL VENOUS BLD VENIPUNCTURE: CPT

## 2023-11-13 PROCEDURE — 84443 ASSAY THYROID STIM HORMONE: CPT

## 2023-11-13 PROCEDURE — 3077F SYST BP >= 140 MM HG: CPT | Performed by: INTERNAL MEDICINE

## 2023-11-13 PROCEDURE — 1036F TOBACCO NON-USER: CPT | Performed by: INTERNAL MEDICINE

## 2023-11-13 PROCEDURE — 80061 LIPID PANEL: CPT

## 2023-11-13 PROCEDURE — 99214 OFFICE O/P EST MOD 30 MIN: CPT | Performed by: INTERNAL MEDICINE

## 2023-11-13 RX ORDER — ATORVASTATIN CALCIUM 80 MG/1
80 TABLET, FILM COATED ORAL NIGHTLY
Qty: 90 TABLET | Refills: 3 | Status: SHIPPED | OUTPATIENT
Start: 2023-11-13

## 2023-11-13 RX ORDER — HYDRALAZINE HYDROCHLORIDE 25 MG/1
25 TABLET, FILM COATED ORAL 2 TIMES DAILY
Qty: 60 TABLET | Refills: 6 | Status: SHIPPED | OUTPATIENT
Start: 2023-11-13 | End: 2024-01-23 | Stop reason: SDUPTHER

## 2023-11-13 RX ORDER — LEVOTHYROXINE SODIUM 200 UG/1
200 TABLET ORAL DAILY
Qty: 30 TABLET | Refills: 11 | Status: SHIPPED | OUTPATIENT
Start: 2023-11-13 | End: 2024-03-26 | Stop reason: SDUPTHER

## 2023-11-13 RX ORDER — VALSARTAN 160 MG/1
320 TABLET ORAL DAILY
Qty: 180 TABLET | Refills: 3 | Status: SHIPPED | OUTPATIENT
Start: 2023-11-13 | End: 2024-03-26 | Stop reason: SDUPTHER

## 2023-11-13 RX ORDER — BUDESONIDE AND FORMOTEROL FUMARATE DIHYDRATE 160; 4.5 UG/1; UG/1
2 AEROSOL RESPIRATORY (INHALATION) 2 TIMES DAILY
Qty: 1 EACH | Refills: 11 | Status: SHIPPED | OUTPATIENT
Start: 2023-11-13

## 2023-11-13 RX ORDER — ALBUTEROL SULFATE 90 UG/1
1-2 AEROSOL, METERED RESPIRATORY (INHALATION) 4 TIMES DAILY
Qty: 18 G | Refills: 11 | Status: SHIPPED | OUTPATIENT
Start: 2023-11-13

## 2023-11-13 RX ORDER — AZITHROMYCIN 250 MG/1
TABLET, FILM COATED ORAL
Qty: 6 TABLET | Refills: 0 | Status: SHIPPED | OUTPATIENT
Start: 2023-11-13 | End: 2023-11-21 | Stop reason: ALTCHOICE

## 2023-11-13 RX ORDER — LEVOTHYROXINE SODIUM 175 UG/1
175 TABLET ORAL DAILY
Qty: 90 TABLET | Refills: 3 | Status: SHIPPED | OUTPATIENT
Start: 2023-11-13 | End: 2024-03-19 | Stop reason: WASHOUT

## 2023-11-13 ASSESSMENT — ENCOUNTER SYMPTOMS
SINUS PRESSURE: 1
CARDIOVASCULAR NEGATIVE: 1
DIZZINESS: 0
SINUS PAIN: 1
CONSTITUTIONAL NEGATIVE: 1
BACK PAIN: 1
ARTHRALGIAS: 0
EYES NEGATIVE: 1
GASTROINTESTINAL NEGATIVE: 1
RESPIRATORY NEGATIVE: 1
FATIGUE: 0

## 2023-11-13 NOTE — PROGRESS NOTES
Subjective   Patient ID: Hira Mojica is a 53 y.o. male who presents for Follow-up (4 mo fu and med refills and head congestion on right side x 1 week /Also needs a new prescription for a CPAP).    HPI   Heart Problem  This is a chronic problem. The current episode started more than 1 year ago. The problem occurs rarely. The problem has been resolved. Associated symptoms include arthralgias. Pertinent negatives include no abdominal pain, anorexia, chest pain, diaphoresis, fatigue or weakness. The treatment provided significant relief.   Congestive Heart Failure  Presents for follow-up visit. Pertinent negatives include no abdominal pain, chest pain, chest pressure, edema, fatigue or shortness of breath. The symptoms have been improving. Compliance with total regimen is %. Compliance problems include adherence to diet.  Has ICD  Thyroid Problem  Presents for follow-up visit. Symptoms include hoarse voice. Patient reports no cold intolerance, diaphoresis, fatigue or weight loss. The symptoms have been stable.   Hypertension  This is a chronic problem. The current episode started more than 1 year ago. The problem is unchanged. The problem is uncontrolled. Pertinent negatives include no anxiety, chest pain or shortness of breath. The current treatment provides mild improvement. Hypertensive end-organ damage includes CAD/MI. There is no history of angina. Identifiable causes of hypertension include sleep apnea and a thyroid problem. BP was high and was in ER and reports reviewed.    Review of Systems   Constitutional: Negative.  Negative for fatigue.   HENT:  Positive for sinus pressure and sinus pain.    Eyes: Negative.    Respiratory: Negative.     Cardiovascular: Negative.    Gastrointestinal: Negative.    Musculoskeletal:  Positive for back pain. Negative for arthralgias.   Skin: Negative.    Neurological:  Negative for dizziness.       Objective   BP (!) 150/100 (BP Location: Left arm, Patient Position:  "Sitting, BP Cuff Size: Adult)   Pulse 67   Temp 36.2 °C (97.1 °F) (Temporal)   Ht 1.778 m (5' 10\")   Wt 99.3 kg (219 lb)   BMI 31.42 kg/m²     Physical Exam  Vitals reviewed.   Constitutional:       Appearance: Normal appearance. He is obese.   HENT:      Head: Normocephalic.   Eyes:      Conjunctiva/sclera: Conjunctivae normal.   Cardiovascular:      Rate and Rhythm: Normal rate and regular rhythm.      Heart sounds:      Gallop present. S4 sounds present.   Pulmonary:      Effort: Pulmonary effort is normal.      Breath sounds: Normal breath sounds.   Abdominal:      Palpations: Abdomen is soft.   Musculoskeletal:         General: Normal range of motion.      Cervical back: Normal range of motion.   Skin:     General: Skin is warm and dry.   Neurological:      General: No focal deficit present.   Psychiatric:         Mood and Affect: Mood normal.         Assessment/Plan   Problem List Items Addressed This Visit             ICD-10-CM    COPD (chronic obstructive pulmonary disease) (CMS/[facility]) J44.9    Essential hypertension I10    Hyperlipidemia E78.5    Hypothyroid E03.9    Obstructive sleep apnea, adult G47.33     Other Visit Diagnoses         Codes    Subacute maxillary sinusitis    -  Primary J01.00    Other cardiomyopathy (CMS/[facility])     I42.8        Pt has CHF, diastolic or systolic were specified, usually three times  a week weight monitoring, salt restriction up to 2 GM Na diet, fluid restriction and continuation of therapy as recommended to be continued. HOB can be kept somewhat elevated at night. Any dyspnea or more then 5 lb weight gain or leg swelling need to be notified, please call if any of above sympts happen and pt will be addressed if possible on outpatient setting. Light exercises are always beneficial with fresh air and deep breathing exercises. Please follow up with us as directed.  ER visit was reviewed, BP was high, EKG was reviewed there was a PVCs, today also his BP readings are high.  " Considering the low ejection fraction we will add hydralazine, he will stay on full dose of valsartan which is 320 mg and carvedilol so hydralazine will be the addition.  He is struggling to keep his BP down, salt restriction is must, CPAP supply needs to be renewed, he has been compliant with CPAP supplies, it is a 17 cm of water pressure, chest x-ray was reviewed, cardiology follow-up was reviewed, rest of laboratories were reviewed, he did not do his TSH and lipid profile last time, please proceed with that, he does not have any breathing compromise.  Please notify me with your BP readings via [employer] I told him.  He has ejection fraction of 35%, if symptoms of sinus infection particularly on the right side does not resolve then notify us because I may have to look for a deviated nasal septum or ostiomeatal complex problems on right side of maxillary sinus, list of medications were reviewed, please inform us if BP readings are high and patient could be a candidate for Entresto that cardiology will decide, follow-up in 4 months.

## 2023-11-14 DIAGNOSIS — J43.2 CENTRILOBULAR EMPHYSEMA (MULTI): ICD-10-CM

## 2023-11-14 RX ORDER — BUDESONIDE AND FORMOTEROL FUMARATE DIHYDRATE 160; 4.5 UG/1; UG/1
2 AEROSOL RESPIRATORY (INHALATION)
Qty: 1 EACH | Refills: 11 | Status: SHIPPED | OUTPATIENT
Start: 2023-11-14 | End: 2024-03-19 | Stop reason: WASHOUT

## 2023-11-17 DIAGNOSIS — J43.2 CENTRILOBULAR EMPHYSEMA (MULTI): Primary | ICD-10-CM

## 2023-11-17 RX ORDER — MOMETASONE FUROATE AND FORMOTEROL FUMARATE DIHYDRATE 200; 5 UG/1; UG/1
2 AEROSOL RESPIRATORY (INHALATION)
Qty: 13 G | Refills: 11 | Status: SHIPPED | OUTPATIENT
Start: 2023-11-17 | End: 2024-11-16

## 2023-11-21 DIAGNOSIS — J43.2 CENTRILOBULAR EMPHYSEMA (MULTI): Primary | ICD-10-CM

## 2023-11-21 RX ORDER — AZELASTINE 1 MG/ML
1 SPRAY, METERED NASAL 2 TIMES DAILY
Qty: 30 ML | Refills: 0 | Status: SHIPPED | OUTPATIENT
Start: 2023-11-21 | End: 2024-11-20

## 2023-11-27 ENCOUNTER — HOSPITAL ENCOUNTER (OUTPATIENT)
Dept: CARDIOLOGY | Facility: HOSPITAL | Age: 53
Discharge: HOME | End: 2023-11-27
Payer: COMMERCIAL

## 2023-11-27 DIAGNOSIS — I47.29 PAROXYSMAL VENTRICULAR TACHYCARDIA (MULTI): ICD-10-CM

## 2023-11-27 DIAGNOSIS — Z95.810 PRESENCE OF AUTOMATIC CARDIOVERTER/DEFIBRILLATOR (AICD): ICD-10-CM

## 2023-12-28 ENCOUNTER — HOSPITAL ENCOUNTER (OUTPATIENT)
Dept: CARDIOLOGY | Facility: HOSPITAL | Age: 53
Discharge: HOME | End: 2023-12-28
Payer: COMMERCIAL

## 2023-12-28 DIAGNOSIS — Z95.810 PRESENCE OF AUTOMATIC CARDIOVERTER/DEFIBRILLATOR (AICD): ICD-10-CM

## 2023-12-28 DIAGNOSIS — I47.29 PAROXYSMAL VENTRICULAR TACHYCARDIA (MULTI): ICD-10-CM

## 2024-01-02 ENCOUNTER — HOSPITAL ENCOUNTER (OUTPATIENT)
Dept: CARDIOLOGY | Facility: HOSPITAL | Age: 54
Discharge: HOME | End: 2024-01-02
Payer: COMMERCIAL

## 2024-01-02 DIAGNOSIS — Z95.810 PRESENCE OF AUTOMATIC CARDIOVERTER/DEFIBRILLATOR (AICD): ICD-10-CM

## 2024-01-02 DIAGNOSIS — I47.29 PAROXYSMAL VENTRICULAR TACHYCARDIA (MULTI): ICD-10-CM

## 2024-01-10 ENCOUNTER — HOSPITAL ENCOUNTER (OUTPATIENT)
Dept: CARDIOLOGY | Facility: HOSPITAL | Age: 54
Discharge: HOME | End: 2024-01-10
Payer: COMMERCIAL

## 2024-01-10 DIAGNOSIS — Z95.810 PRESENCE OF AUTOMATIC CARDIOVERTER/DEFIBRILLATOR (AICD): ICD-10-CM

## 2024-01-10 DIAGNOSIS — I47.29 PAROXYSMAL VENTRICULAR TACHYCARDIA (MULTI): ICD-10-CM

## 2024-01-22 ENCOUNTER — HOSPITAL ENCOUNTER (OUTPATIENT)
Dept: CARDIOLOGY | Facility: HOSPITAL | Age: 54
Discharge: HOME | End: 2024-01-22
Payer: COMMERCIAL

## 2024-01-22 DIAGNOSIS — I47.29 PAROXYSMAL VENTRICULAR TACHYCARDIA (MULTI): ICD-10-CM

## 2024-01-22 DIAGNOSIS — Z95.810 PRESENCE OF AUTOMATIC CARDIOVERTER/DEFIBRILLATOR (AICD): ICD-10-CM

## 2024-01-22 PROCEDURE — 93295 DEV INTERROG REMOTE 1/2/MLT: CPT | Performed by: INTERNAL MEDICINE

## 2024-01-22 PROCEDURE — 93296 REM INTERROG EVL PM/IDS: CPT

## 2024-01-23 ENCOUNTER — OFFICE VISIT (OUTPATIENT)
Dept: CARDIOLOGY | Facility: CLINIC | Age: 54
End: 2024-01-23
Payer: COMMERCIAL

## 2024-01-23 VITALS
HEIGHT: 70 IN | DIASTOLIC BLOOD PRESSURE: 96 MMHG | BODY MASS INDEX: 31.48 KG/M2 | WEIGHT: 219.9 LBS | SYSTOLIC BLOOD PRESSURE: 138 MMHG | HEART RATE: 60 BPM

## 2024-01-23 DIAGNOSIS — I10 ESSENTIAL HYPERTENSION: ICD-10-CM

## 2024-01-23 DIAGNOSIS — Z95.810 CARDIAC DEFIBRILLATOR IN PLACE: ICD-10-CM

## 2024-01-23 DIAGNOSIS — Z98.61 CAD S/P PERCUTANEOUS CORONARY ANGIOPLASTY: ICD-10-CM

## 2024-01-23 DIAGNOSIS — I25.10 CAD S/P PERCUTANEOUS CORONARY ANGIOPLASTY: ICD-10-CM

## 2024-01-23 DIAGNOSIS — E78.2 MIXED HYPERLIPIDEMIA: ICD-10-CM

## 2024-01-23 DIAGNOSIS — Z87.891 FORMER CIGARETTE SMOKER: ICD-10-CM

## 2024-01-23 PROCEDURE — 3008F BODY MASS INDEX DOCD: CPT | Performed by: INTERNAL MEDICINE

## 2024-01-23 PROCEDURE — 99214 OFFICE O/P EST MOD 30 MIN: CPT | Performed by: INTERNAL MEDICINE

## 2024-01-23 PROCEDURE — 3075F SYST BP GE 130 - 139MM HG: CPT | Performed by: INTERNAL MEDICINE

## 2024-01-23 PROCEDURE — 3080F DIAST BP >= 90 MM HG: CPT | Performed by: INTERNAL MEDICINE

## 2024-01-23 PROCEDURE — 1036F TOBACCO NON-USER: CPT | Performed by: INTERNAL MEDICINE

## 2024-01-23 RX ORDER — HYDRALAZINE HYDROCHLORIDE 50 MG/1
50 TABLET, FILM COATED ORAL 2 TIMES DAILY
Qty: 180 TABLET | Refills: 3 | Status: SHIPPED | OUTPATIENT
Start: 2024-01-23 | End: 2025-01-22

## 2024-01-23 NOTE — PATIENT INSTRUCTIONS
STOP Plavix  INCREASE Hydralazine to 50 mg twice a day  Monitor and record your B/P for the next couple of weeks and let the office know if improving with increased dose of Hydralazine.    Follow up office visit in 6 months.    Continue same medications/treatment.  Patient educated on proper medication use.  Please bring all medicines, vitamins and herbal supplements with you when you come to the office.    Sarah JIMÉNEZ LPN, am scribing for and in the presence of  Dr. Esthela Trimble MD, FACC

## 2024-01-23 NOTE — PROGRESS NOTES
Referred by Dr. Bauman ref. provider found provider found for   Chief Complaint   Patient presents with    Follow-up     6 month follow up        History of Present Illness  Hira Mojica is a 53 y.o. year old male patient with history of coronary artery disease.  He went to emergency room because of significant hypertension.  He was started on hydralazine 25 twice daily.  Blood pressures about 138/96.  He is now 1 year following his coronary angioplasty with stent [place].  I discussed with the patient in length that he he can stop his Plavix for now and continue baby aspirin.  I will increase his hydralazine to 50 twice daily.  He will be following up with his primary care physician regarding blood pressure control.  He will call for any problem and follow-up in few months    Past Medical History  Past Medical History:   Diagnosis Date    Abnormal ECG     Aneurysm (CMS/[facility])     Arrhythmia     Asthma     Atrial fibrillation (CMS/[facility])     CHF (congestive heart failure) (CMS/[facility])     COPD (chronic obstructive pulmonary disease) (CMS/[facility])     Coronary artery disease     Hyperlipidemia     Hypertension     Myocardial infarction (CMS/[facility])     Sleep apnea        Social History  Social History     Tobacco Use    Smoking status: Former     Packs/day: 3.00     Years: 20.00     Additional pack years: 0.00     Total pack years: 60.00     Types: Cigarettes     Quit date:      Years since quittin.0    Smokeless tobacco: Never   Vaping Use    Vaping Use: Never used   Substance Use Topics    Alcohol use: Not Currently    Drug use: Never       Family History     Family History   Problem Relation Name Age of Onset    Other (arteriosclerotic CVD) Mother Telma     Other (cardiac disorder) Mother Telma     Stroke Mother Telma     Cancer Mother Telma     Colon cancer Mother Telma         had colonectomy which seemed to stop the spread    Abnormal EKG Mother Telma     Angina Mother Telma     Arrhythmia Mother Telma     Atrial  fibrillation Mother Telma     Diabetes type II Mother Telma     Heart attack Mother Telma     Heart failure Mother Telma     Hyperlipidemia Mother Telma     Hypertension Mother Telma     Thyroid disease Mother Telma     Other (arteriosclerotic CVD) Father DARCI     Stroke Father DARCI     Cancer Father DARCI     Asthma Father DARCI     Hypertension Father DACRI     Lung disease Father DARCI     Other (arteriosclerotic CVD) Brother russ     Heart attack Brother russ     Heart disease Brother russ     Obesity Brother russ     Thyroid disease Mother's Sister reyes     Other (cardiac disorder) Other grandmother     Colon cancer Other grandmother         was too late for any remedies and passed away within a month of dx    Cancer Other aunt        Review of Systems  As per HPI, all other systems reviewed and negative.    Allergies:  Allergies   Allergen Reactions    Penicillins Hives    Ketorolac Other     Adverse reaction        Outpatient Medications:  Current Outpatient Medications   Medication Instructions    albuterol 90 mcg/actuation inhaler 1-2 puffs, inhalation, 4 times daily    aspirin 81 mg EC tablet 1 tablet, oral, Daily    atorvastatin (LIPITOR) 80 mg, oral, Nightly    azelastine (Astelin) 137 mcg (0.1 %) nasal spray 1 spray, Each Nostril, 2 times daily, Use in each nostril as directed    budesonide-formoteroL (Symbicort) 160-4.5 mcg/actuation inhaler 2 puffs, inhalation, 2 times daily, RINSE MOUTH AFTER USE.    budesonide-formoteroL (Symbicort) 160-4.5 mcg/actuation inhaler 2 puffs, inhalation, 2 times daily RT, Rinse mouth with water after use to reduce aftertaste and incidence of candidiasis. Do not swallow.    carvedilol (COREG) 25 mg, oral, 2 times daily    clopidogrel (PLAVIX) 75 mg, oral, Daily    Dulera 200-5 mcg/actuation inhaler 2 puffs, inhalation, 2 times daily RT, Rinse mouth with water after use to reduce aftertaste and incidence of candidiasis. Do not swallow.    hydrALAZINE (APRESOLINE) 25 mg, oral, 2 times  daily    isosorbide mononitrate ER (Imdur) 30 mg 24 hr tablet 1 tablet, oral, Daily, In morning    levothyroxine (SYNTHROID, LEVOXYL) 175 mcg, oral, Daily    levothyroxine (SYNTHROID, LEVOXYL) 200 mcg, oral, Daily    magnesium oxide 400 mg, oral, Daily    nitroglycerin (Nitrostat) 0.4 mg SL tablet sublingual, PLACE 1 TABLET UNDER THE TONGUE EVERY 5 MINUTES FOR UP TO 3 DOSES AS NEEDED FOR CHEST PAIN.CALL 911 IF PAIN PERSISTS.    oxygen (O2) gas therapy 3 L NC at HS    valsartan (DIOVAN) 320 mg, oral, Daily         Vitals:  Vitals:    01/23/24 1110   BP: (!) 138/96   Pulse: 60       Physical Exam:  Physical Exam  Vitals and nursing note reviewed.   Constitutional:       Appearance: Normal appearance.   HENT:      Head: Normocephalic and atraumatic.   Eyes:      Extraocular Movements: Extraocular movements intact.      Pupils: Pupils are equal, round, and reactive to light.   Cardiovascular:      Rate and Rhythm: Normal rate and regular rhythm.      Pulses: Normal pulses.   Pulmonary:      Effort: Pulmonary effort is normal.      Breath sounds: Normal breath sounds.   Musculoskeletal:         General: Normal range of motion.      Cervical back: Normal range of motion.      Right lower leg: No edema.      Left lower leg: No edema.   Skin:     General: Skin is warm and dry.   Neurological:      General: No focal deficit present.      Mental Status: He is alert and oriented to person, place, and time.             Assessment/Plan   Diagnoses and all orders for this visit:  CAD S/P percutaneous coronary angioplasty  Cardiac defibrillator in place  Essential hypertension  Mixed hyperlipidemia  BMI 31.0-31.9,adult  Former cigarette smoker          Esthela Trimble MD [facility]  Interventional Cardiology   of [facility]     Thank you for allowing me to participate in the care of this patient. Please do not hesitate to contact me with any further questions or concerns.

## 2024-01-30 ENCOUNTER — HOSPITAL ENCOUNTER (EMERGENCY)
Facility: HOSPITAL | Age: 54
Discharge: HOME | End: 2024-01-30
Attending: STUDENT IN AN ORGANIZED HEALTH CARE EDUCATION/TRAINING PROGRAM
Payer: COMMERCIAL

## 2024-01-30 ENCOUNTER — APPOINTMENT (OUTPATIENT)
Dept: RADIOLOGY | Facility: HOSPITAL | Age: 54
End: 2024-01-30
Payer: COMMERCIAL

## 2024-01-30 VITALS
DIASTOLIC BLOOD PRESSURE: 98 MMHG | BODY MASS INDEX: 30.06 KG/M2 | HEART RATE: 63 BPM | OXYGEN SATURATION: 99 % | WEIGHT: 210 LBS | TEMPERATURE: 96.8 F | SYSTOLIC BLOOD PRESSURE: 152 MMHG | HEIGHT: 70 IN | RESPIRATION RATE: 16 BRPM

## 2024-01-30 DIAGNOSIS — R51.9 NONINTRACTABLE HEADACHE, UNSPECIFIED CHRONICITY PATTERN, UNSPECIFIED HEADACHE TYPE: Primary | ICD-10-CM

## 2024-01-30 LAB
ALBUMIN SERPL BCP-MCNC: 4.3 G/DL (ref 3.4–5)
ALP SERPL-CCNC: 51 U/L (ref 33–120)
ALT SERPL W P-5'-P-CCNC: 17 U/L (ref 10–52)
ANION GAP SERPL CALC-SCNC: 10 MMOL/L (ref 10–20)
AST SERPL W P-5'-P-CCNC: 20 U/L (ref 9–39)
BASOPHILS # BLD AUTO: 0.06 X10*3/UL (ref 0–0.1)
BASOPHILS NFR BLD AUTO: 0.6 %
BILIRUB SERPL-MCNC: 0.7 MG/DL (ref 0–1.2)
BUN SERPL-MCNC: 18 MG/DL (ref 6–23)
CALCIUM SERPL-MCNC: 9.2 MG/DL (ref 8.6–10.3)
CHLORIDE SERPL-SCNC: 106 MMOL/L (ref 98–107)
CO2 SERPL-SCNC: 27 MMOL/L (ref 21–32)
CREAT SERPL-MCNC: 0.98 MG/DL (ref 0.5–1.3)
EGFRCR SERPLBLD CKD-EPI 2021: >90 ML/MIN/1.73M*2
EOSINOPHIL # BLD AUTO: 0.26 X10*3/UL (ref 0–0.7)
EOSINOPHIL NFR BLD AUTO: 2.8 %
ERYTHROCYTE [DISTWIDTH] IN BLOOD BY AUTOMATED COUNT: 13 % (ref 11.5–14.5)
GLUCOSE SERPL-MCNC: 96 MG/DL (ref 74–99)
HCT VFR BLD AUTO: 43.6 % (ref 41–52)
HGB BLD-MCNC: 15.3 G/DL (ref 13.5–17.5)
IMM GRANULOCYTES # BLD AUTO: 0.02 X10*3/UL (ref 0–0.7)
IMM GRANULOCYTES NFR BLD AUTO: 0.2 % (ref 0–0.9)
LYMPHOCYTES # BLD AUTO: 1.71 X10*3/UL (ref 1.2–4.8)
LYMPHOCYTES NFR BLD AUTO: 18.4 %
MAGNESIUM SERPL-MCNC: 2.06 MG/DL (ref 1.6–2.4)
MCH RBC QN AUTO: 34.9 PG (ref 26–34)
MCHC RBC AUTO-ENTMCNC: 35.1 G/DL (ref 32–36)
MCV RBC AUTO: 100 FL (ref 80–100)
MONOCYTES # BLD AUTO: 0.71 X10*3/UL (ref 0.1–1)
MONOCYTES NFR BLD AUTO: 7.7 %
NEUTROPHILS # BLD AUTO: 6.51 X10*3/UL (ref 1.2–7.7)
NEUTROPHILS NFR BLD AUTO: 70.3 %
NRBC BLD-RTO: 0 /100 WBCS (ref 0–0)
PLATELET # BLD AUTO: 228 X10*3/UL (ref 150–450)
POTASSIUM SERPL-SCNC: 3.8 MMOL/L (ref 3.5–5.3)
PROT SERPL-MCNC: 7 G/DL (ref 6.4–8.2)
RBC # BLD AUTO: 4.38 X10*6/UL (ref 4.5–5.9)
SODIUM SERPL-SCNC: 139 MMOL/L (ref 136–145)
WBC # BLD AUTO: 9.3 X10*3/UL (ref 4.4–11.3)

## 2024-01-30 PROCEDURE — 36415 COLL VENOUS BLD VENIPUNCTURE: CPT | Performed by: STUDENT IN AN ORGANIZED HEALTH CARE EDUCATION/TRAINING PROGRAM

## 2024-01-30 PROCEDURE — 80053 COMPREHEN METABOLIC PANEL: CPT | Performed by: STUDENT IN AN ORGANIZED HEALTH CARE EDUCATION/TRAINING PROGRAM

## 2024-01-30 PROCEDURE — 96365 THER/PROPH/DIAG IV INF INIT: CPT

## 2024-01-30 PROCEDURE — 85025 COMPLETE CBC W/AUTO DIFF WBC: CPT | Performed by: STUDENT IN AN ORGANIZED HEALTH CARE EDUCATION/TRAINING PROGRAM

## 2024-01-30 PROCEDURE — 83735 ASSAY OF MAGNESIUM: CPT | Performed by: STUDENT IN AN ORGANIZED HEALTH CARE EDUCATION/TRAINING PROGRAM

## 2024-01-30 PROCEDURE — 70450 CT HEAD/BRAIN W/O DYE: CPT | Performed by: RADIOLOGY

## 2024-01-30 PROCEDURE — 70450 CT HEAD/BRAIN W/O DYE: CPT

## 2024-01-30 PROCEDURE — 2500000004 HC RX 250 GENERAL PHARMACY W/ HCPCS (ALT 636 FOR OP/ED): Performed by: STUDENT IN AN ORGANIZED HEALTH CARE EDUCATION/TRAINING PROGRAM

## 2024-01-30 PROCEDURE — 96375 TX/PRO/DX INJ NEW DRUG ADDON: CPT

## 2024-01-30 PROCEDURE — 2500000001 HC RX 250 WO HCPCS SELF ADMINISTERED DRUGS (ALT 637 FOR MEDICARE OP): Performed by: STUDENT IN AN ORGANIZED HEALTH CARE EDUCATION/TRAINING PROGRAM

## 2024-01-30 PROCEDURE — 96361 HYDRATE IV INFUSION ADD-ON: CPT

## 2024-01-30 PROCEDURE — 99284 EMERGENCY DEPT VISIT MOD MDM: CPT | Mod: 25

## 2024-01-30 RX ORDER — CYCLOBENZAPRINE HCL 10 MG
5 TABLET ORAL ONCE
Status: COMPLETED | OUTPATIENT
Start: 2024-01-30 | End: 2024-01-30

## 2024-01-30 RX ORDER — FENTANYL CITRATE 50 UG/ML
25 INJECTION, SOLUTION INTRAMUSCULAR; INTRAVENOUS ONCE
Status: COMPLETED | OUTPATIENT
Start: 2024-01-30 | End: 2024-01-30

## 2024-01-30 RX ORDER — MAGNESIUM SULFATE 1 G/100ML
1 INJECTION INTRAVENOUS ONCE
Status: COMPLETED | OUTPATIENT
Start: 2024-01-30 | End: 2024-01-30

## 2024-01-30 RX ORDER — METOCLOPRAMIDE HYDROCHLORIDE 5 MG/ML
10 INJECTION INTRAMUSCULAR; INTRAVENOUS ONCE
Status: COMPLETED | OUTPATIENT
Start: 2024-01-30 | End: 2024-01-30

## 2024-01-30 RX ORDER — HYDROCODONE BITARTRATE AND ACETAMINOPHEN 5; 325 MG/1; MG/1
1 TABLET ORAL EVERY 6 HOURS PRN
Qty: 4 TABLET | Refills: 0 | Status: SHIPPED | OUTPATIENT
Start: 2024-01-30 | End: 2024-01-31

## 2024-01-30 RX ORDER — DEXAMETHASONE SODIUM PHOSPHATE 4 MG/ML
4 INJECTION, SOLUTION INTRA-ARTICULAR; INTRALESIONAL; INTRAMUSCULAR; INTRAVENOUS; SOFT TISSUE ONCE
Status: COMPLETED | OUTPATIENT
Start: 2024-01-30 | End: 2024-01-30

## 2024-01-30 RX ORDER — DIPHENHYDRAMINE HYDROCHLORIDE 50 MG/ML
12.5 INJECTION INTRAMUSCULAR; INTRAVENOUS ONCE
Status: COMPLETED | OUTPATIENT
Start: 2024-01-30 | End: 2024-01-30

## 2024-01-30 RX ADMIN — SODIUM CHLORIDE 1000 ML: 9 INJECTION, SOLUTION INTRAVENOUS at 12:36

## 2024-01-30 RX ADMIN — DEXAMETHASONE SODIUM PHOSPHATE 4 MG: 4 INJECTION, SOLUTION INTRAMUSCULAR; INTRAVENOUS at 15:51

## 2024-01-30 RX ADMIN — DIPHENHYDRAMINE HYDROCHLORIDE 12.5 MG: 50 INJECTION, SOLUTION INTRAMUSCULAR; INTRAVENOUS at 12:37

## 2024-01-30 RX ADMIN — METOCLOPRAMIDE HYDROCHLORIDE 10 MG: 5 INJECTION INTRAMUSCULAR; INTRAVENOUS at 12:40

## 2024-01-30 RX ADMIN — MAGNESIUM SULFATE HEPTAHYDRATE 1 G: 1 INJECTION, SOLUTION INTRAVENOUS at 15:56

## 2024-01-30 RX ADMIN — FENTANYL CITRATE 25 MCG: 50 INJECTION, SOLUTION INTRAMUSCULAR; INTRAVENOUS at 12:39

## 2024-01-30 RX ADMIN — CYCLOBENZAPRINE HYDROCHLORIDE 5 MG: 10 TABLET, FILM COATED ORAL at 14:57

## 2024-01-30 ASSESSMENT — LIFESTYLE VARIABLES
EVER HAD A DRINK FIRST THING IN THE MORNING TO STEADY YOUR NERVES TO GET RID OF A HANGOVER: NO
HAVE PEOPLE ANNOYED YOU BY CRITICIZING YOUR DRINKING: NO
HAVE YOU EVER FELT YOU SHOULD CUT DOWN ON YOUR DRINKING: NO
EVER FELT BAD OR GUILTY ABOUT YOUR DRINKING: NO

## 2024-01-30 ASSESSMENT — COLUMBIA-SUICIDE SEVERITY RATING SCALE - C-SSRS
6. HAVE YOU EVER DONE ANYTHING, STARTED TO DO ANYTHING, OR PREPARED TO DO ANYTHING TO END YOUR LIFE?: NO
1. IN THE PAST MONTH, HAVE YOU WISHED YOU WERE DEAD OR WISHED YOU COULD GO TO SLEEP AND NOT WAKE UP?: NO
2. HAVE YOU ACTUALLY HAD ANY THOUGHTS OF KILLING YOURSELF?: NO

## 2024-01-30 ASSESSMENT — PAIN DESCRIPTION - LOCATION
LOCATION: HEAD
LOCATION: HEAD

## 2024-01-30 ASSESSMENT — PAIN - FUNCTIONAL ASSESSMENT: PAIN_FUNCTIONAL_ASSESSMENT: 0-10

## 2024-01-30 ASSESSMENT — PAIN SCALES - GENERAL
PAINLEVEL_OUTOF10: 7
PAINLEVEL_OUTOF10: 9
PAINLEVEL_OUTOF10: 9
PAINLEVEL_OUTOF10: 7

## 2024-01-30 NOTE — ED PROVIDER NOTES
HPI   Chief Complaint   Patient presents with    Headache     X3 days, with light sensitivity and nausea. No recent falls, no fevers       Patient is a 53-year-old male presenting to the emergency department complaints of headache for the past 3 days.  Patient states he has a history of migraines and it has been years.  Patient states this is more intense than his previous migraines.  Patient was previously treated for migraines but is no longer on any preventative or abortive medications for his migraines.  He denies any falls, traumatic injuries, chest pain, difficulty breathing, abdominal pain, vomiting.  Patient states he has done Tylenol and ibuprofen at home without any relief.      History provided by:  Patient   used: No                        No data recorded                Patient History   Past Medical History:   Diagnosis Date    Abnormal ECG     Aneurysm (CMS/[facility])     Arrhythmia     Asthma     Atrial fibrillation (CMS/[facility])     CHF (congestive heart failure) (CMS/[facility])     COPD (chronic obstructive pulmonary disease) (CMS/[facility])     Coronary artery disease     Hyperlipidemia     Hypertension     Myocardial infarction (CMS/[facility])     Sleep apnea      Past Surgical History:   Procedure Laterality Date    APPENDECTOMY  01/23/2017    Appendectomy    CARDIAC CATHETERIZATION      CHOLECYSTECTOMY  01/23/2017    Cholecystectomy    CORONARY ANGIOPLASTY      CORONARY STENT PLACEMENT      HERNIA REPAIR  01/23/2017    Inguinal Hernia Repair    HERNIA REPAIR  02/16/2017    Hernia Repair    INSERT / REPLACE / REMOVE PACEMAKER      OTHER SURGICAL HISTORY  05/10/2021    Testicular surgery    OTHER SURGICAL HISTORY  05/10/2021    Coronary artery stent placement    OTHER SURGICAL HISTORY  06/07/2021    Inguinal hernia repair    OTHER SURGICAL HISTORY  07/27/2022    Cardiac catheterization    OTHER SURGICAL HISTORY  07/27/2022    Colonoscopy    OTHER SURGICAL HISTORY  07/27/2022    Cyst excision    OTHER  SURGICAL HISTORY  2022    Percutaneous transluminal coronary angioplasty    ROTATOR CUFF REPAIR  2017    Rotator Cuff Repair    TOTAL THYROIDECTOMY  2017    Thyroid Surgery Total Thyroidectomy     Family History   Problem Relation Name Age of Onset    Other (arteriosclerotic CVD) Mother Telma     Other (cardiac disorder) Mother Telma     Stroke Mother Telma     Cancer Mother Telma     Colon cancer Mother Telma         had colonectomy which seemed to stop the spread    Abnormal EKG Mother Telma     Angina Mother Telma     Arrhythmia Mother Telma     Atrial fibrillation Mother Telma     Diabetes type II Mother Telma     Heart attack Mother Telma     Heart failure Mother Telma     Hyperlipidemia Mother Telma     Hypertension Mother Telma     Thyroid disease Mother Telma     Other (arteriosclerotic CVD) Father DARCI     Stroke Father DARCI     Cancer Father DARCI     Asthma Father DARCI     Hypertension Father DARCI     Lung disease Father DARCI     Other (arteriosclerotic CVD) Brother russ     Heart attack Brother russ     Heart disease Brother russ     Obesity Brother russ     Thyroid disease Mother's Sister reyes     Other (cardiac disorder) Other grandmother     Colon cancer Other grandmother         was too late for any remedies and passed away within a month of dx    Cancer Other aunt      Social History     Tobacco Use    Smoking status: Former     Packs/day: 3.00     Years: 20.00     Additional pack years: 0.00     Total pack years: 60.00     Types: Cigarettes     Quit date:      Years since quittin.0    Smokeless tobacco: Never   Vaping Use    Vaping Use: Never used   Substance Use Topics    Alcohol use: Not Currently    Drug use: Never       Physical Exam   ED Triage Vitals [24 1048]   Temperature Heart Rate Respirations BP   36 °C (96.8 °F) 73 20 136/85      Pulse Ox Temp Source Heart Rate Source Patient Position   96 % Temporal -- --      BP Location FiO2 (%)     Left arm --       Physical  Exam  Vitals and nursing note reviewed.   Constitutional:       General: He is not in acute distress.     Appearance: Normal appearance. He is not ill-appearing, toxic-appearing or diaphoretic.   HENT:      Head: Normocephalic and atraumatic.      Nose: Nose normal.      Mouth/Throat:      Mouth: Mucous membranes are moist.      Pharynx: No oropharyngeal exudate or posterior oropharyngeal erythema.   Eyes:      General: No scleral icterus.     Extraocular Movements: Extraocular movements intact.      Pupils: Pupils are equal, round, and reactive to light.   Cardiovascular:      Rate and Rhythm: Normal rate and regular rhythm.      Pulses: Normal pulses.      Heart sounds: Normal heart sounds. No murmur heard.     No friction rub. No gallop.   Pulmonary:      Effort: Pulmonary effort is normal. No respiratory distress.      Breath sounds: Normal breath sounds. No stridor. No wheezing, rhonchi or rales.   Chest:      Chest wall: No tenderness.   Abdominal:      General: Abdomen is flat. There is no distension.      Palpations: Abdomen is soft. There is no mass.      Tenderness: There is no abdominal tenderness. There is no guarding.      Hernia: No hernia is present.   Musculoskeletal:         General: No swelling, tenderness, deformity or signs of injury. Normal range of motion.      Cervical back: Normal range of motion and neck supple. No rigidity.   Skin:     General: Skin is warm and dry.      Capillary Refill: Capillary refill takes less than 2 seconds.      Coloration: Skin is not jaundiced or pale.      Findings: No bruising, erythema, lesion or rash.   Neurological:      General: No focal deficit present.      Mental Status: He is alert and oriented to person, place, and time. Mental status is at baseline.   Psychiatric:         Mood and Affect: Mood normal.         Behavior: Behavior normal.         ED Course & MDM   Diagnoses as of 01/30/24 1649   Nonintractable headache, unspecified chronicity pattern,  unspecified headache type       Medical Decision Making  Patient is a 53-year-old male presenting to the emergency department for complaints of headache.  Given that the patient states this is more intense than his previous migraines CT scan was ordered along with labs to evaluate for electrolyte dyscrasias.  Migraine cocktail was ordered.  Patient was not given Toradol due to allergies.  After initially receiving IV fluids, Benadryl, Reglan, fentanyl patient had improvement of his headache from a 9 to a 6.  Additional Decadron, Flexeril, magnesium was ordered.  CT of the head was negative for acute intracranial abnormalities.  CBC CMP and magnesium levels reviewed and unremarkable.  Patient reevaluated and resting comfortably.  I did offer admission however the patient declined and states he would prefer to go home and states he will come back if there or any changes.  Patient was advised follow-up with his doctors and return to the emergency department any new or worsening symptoms.  All questions were answered.  Patient was appreciative care and agreeable discharge.    Amount and/or Complexity of Data Reviewed  Labs: ordered. Decision-making details documented in ED Course.  Radiology: ordered. Decision-making details documented in ED Course.      Labs Reviewed   CBC WITH AUTO DIFFERENTIAL - Abnormal       Result Value    WBC 9.3      nRBC 0.0      RBC 4.38 (*)     Hemoglobin 15.3      Hematocrit 43.6            MCH 34.9 (*)     MCHC 35.1      RDW 13.0      Platelets 228      Neutrophils % 70.3      Immature Granulocytes %, Automated 0.2      Lymphocytes % 18.4      Monocytes % 7.7      Eosinophils % 2.8      Basophils % 0.6      Neutrophils Absolute 6.51      Immature Granulocytes Absolute, Automated 0.02      Lymphocytes Absolute 1.71      Monocytes Absolute 0.71      Eosinophils Absolute 0.26      Basophils Absolute 0.06     COMPREHENSIVE METABOLIC PANEL - Normal    Glucose 96      Sodium 139       Potassium 3.8      Chloride 106      Bicarbonate 27      Anion Gap 10      Urea Nitrogen 18      Creatinine 0.98      eGFR >90      Calcium 9.2      Albumin 4.3      Alkaline Phosphatase 51      Total Protein 7.0      AST 20      Bilirubin, Total 0.7      ALT 17     MAGNESIUM - Normal    Magnesium 2.06       CT head wo IV contrast   Final Result   No CT evidence of acute intracranial abnormality.        Signed by: Yandel Lares 1/30/2024 2:03 PM   Dictation workstation:   [id number]            Procedure  Procedures     Duran Beasley DO  01/30/24 1657

## 2024-01-30 NOTE — DISCHARGE INSTRUCTIONS
Please follow up with your Primary Care Provider (PCP) within the next 2-3 days regarding your ED visit. These documents have a lot of useful information! Please read them, so you know what to expect, what you can do for yourself at home, and also to know concerning signs warrant a return to the Emergency Department for additional evaluation.  You are welcome back any time. Thank you for entrusting your care to us, I hope we made your visit as pleasant as possible. Wishing you well!    Dr. Beasley

## 2024-02-21 ENCOUNTER — HOSPITAL ENCOUNTER (OUTPATIENT)
Dept: CARDIOLOGY | Facility: HOSPITAL | Age: 54
Discharge: HOME | End: 2024-02-21
Payer: COMMERCIAL

## 2024-02-21 DIAGNOSIS — Z95.810 PRESENCE OF AUTOMATIC CARDIOVERTER/DEFIBRILLATOR (AICD): ICD-10-CM

## 2024-02-21 DIAGNOSIS — I47.29 PAROXYSMAL VENTRICULAR TACHYCARDIA (MULTI): ICD-10-CM

## 2024-03-04 ENCOUNTER — HOSPITAL ENCOUNTER (OUTPATIENT)
Dept: CARDIOLOGY | Facility: HOSPITAL | Age: 54
Discharge: HOME | End: 2024-03-04
Payer: COMMERCIAL

## 2024-03-04 DIAGNOSIS — Z95.810 PRESENCE OF AUTOMATIC CARDIOVERTER/DEFIBRILLATOR (AICD): ICD-10-CM

## 2024-03-04 DIAGNOSIS — I47.29 PAROXYSMAL VENTRICULAR TACHYCARDIA (MULTI): ICD-10-CM

## 2024-03-11 ENCOUNTER — HOSPITAL ENCOUNTER (OUTPATIENT)
Dept: CARDIOLOGY | Facility: HOSPITAL | Age: 54
Discharge: HOME | End: 2024-03-11
Payer: COMMERCIAL

## 2024-03-11 DIAGNOSIS — Z95.810 PRESENCE OF AUTOMATIC CARDIOVERTER/DEFIBRILLATOR (AICD): ICD-10-CM

## 2024-03-11 DIAGNOSIS — I47.29 PAROXYSMAL VENTRICULAR TACHYCARDIA (MULTI): ICD-10-CM

## 2024-03-19 ENCOUNTER — OFFICE VISIT (OUTPATIENT)
Dept: PRIMARY CARE | Facility: CLINIC | Age: 54
End: 2024-03-19
Payer: COMMERCIAL

## 2024-03-19 VITALS
SYSTOLIC BLOOD PRESSURE: 124 MMHG | DIASTOLIC BLOOD PRESSURE: 79 MMHG | BODY MASS INDEX: 32.5 KG/M2 | HEIGHT: 70 IN | TEMPERATURE: 97.8 F | HEART RATE: 78 BPM | WEIGHT: 227 LBS

## 2024-03-19 DIAGNOSIS — I25.10 CAD S/P PERCUTANEOUS CORONARY ANGIOPLASTY: ICD-10-CM

## 2024-03-19 DIAGNOSIS — I10 ESSENTIAL HYPERTENSION: ICD-10-CM

## 2024-03-19 DIAGNOSIS — J43.2 CENTRILOBULAR EMPHYSEMA (MULTI): ICD-10-CM

## 2024-03-19 DIAGNOSIS — Z98.61 CAD S/P PERCUTANEOUS CORONARY ANGIOPLASTY: ICD-10-CM

## 2024-03-19 DIAGNOSIS — I42.0 ISCHEMIC CONGESTIVE CARDIOMYOPATHY (MULTI): ICD-10-CM

## 2024-03-19 DIAGNOSIS — G47.33 OBSTRUCTIVE SLEEP APNEA, ADULT: ICD-10-CM

## 2024-03-19 DIAGNOSIS — I25.5 ISCHEMIC CONGESTIVE CARDIOMYOPATHY (MULTI): ICD-10-CM

## 2024-03-19 DIAGNOSIS — I42.8 OTHER CARDIOMYOPATHY (MULTI): ICD-10-CM

## 2024-03-19 DIAGNOSIS — H70.92 MASTOIDITIS OF LEFT SIDE: ICD-10-CM

## 2024-03-19 DIAGNOSIS — E03.9 ACQUIRED HYPOTHYROIDISM: ICD-10-CM

## 2024-03-19 DIAGNOSIS — I47.29 OTHER VENTRICULAR TACHYCARDIA (MULTI): Primary | ICD-10-CM

## 2024-03-19 PROCEDURE — 3078F DIAST BP <80 MM HG: CPT | Performed by: INTERNAL MEDICINE

## 2024-03-19 PROCEDURE — 3074F SYST BP LT 130 MM HG: CPT | Performed by: INTERNAL MEDICINE

## 2024-03-19 PROCEDURE — 1036F TOBACCO NON-USER: CPT | Performed by: INTERNAL MEDICINE

## 2024-03-19 PROCEDURE — 99213 OFFICE O/P EST LOW 20 MIN: CPT | Performed by: INTERNAL MEDICINE

## 2024-03-19 PROCEDURE — 3008F BODY MASS INDEX DOCD: CPT | Performed by: INTERNAL MEDICINE

## 2024-03-19 RX ORDER — CIPROFLOXACIN 500 MG/1
500 TABLET ORAL 2 TIMES DAILY
Qty: 10 TABLET | Refills: 0 | Status: SHIPPED | OUTPATIENT
Start: 2024-03-19 | End: 2024-03-26 | Stop reason: WASHOUT

## 2024-03-19 RX ORDER — TORSEMIDE 20 MG/1
20 TABLET ORAL DAILY
Qty: 30 TABLET | Refills: 11 | Status: SHIPPED | OUTPATIENT
Start: 2024-03-19 | End: 2025-03-19

## 2024-03-19 ASSESSMENT — ENCOUNTER SYMPTOMS
NEUROLOGICAL NEGATIVE: 1
MUSCULOSKELETAL NEGATIVE: 1
ABDOMINAL DISTENTION: 0
GASTROINTESTINAL NEGATIVE: 1
ABDOMINAL PAIN: 0
RESPIRATORY NEGATIVE: 1
CARDIOVASCULAR NEGATIVE: 1
CONSTITUTIONAL NEGATIVE: 1

## 2024-03-19 NOTE — PROGRESS NOTES
Subjective   Patient ID: Hira Mojica is a 53 y.o. male who presents for Edema (Feet swelling and bloating).    Edema    Pertinent negative symptoms include no abdominal pain.      Heart Problem  This is a chronic problem. The current episode started more than 1 year ago. The problem occurs rarely. The problem has been resolved. Associated symptoms include arthralgias. Pertinent negatives include no abdominal pain, anorexia, chest pain, diaphoresis, fatigue or weakness. The treatment provided significant relief.   Congestive Heart Failure  Presents for follow-up visit. Pertinent negatives include no abdominal pain, chest pain, chest pressure, edema, fatigue or shortness of breath. The symptoms have been improving. Compliance with total regimen is %. Compliance problems include adherence to diet.  Has ICD  Thyroid Problem  Presents for follow-up visit. Symptoms include hoarse voice. Patient reports no cold intolerance, diaphoresis, fatigue or weight loss. The symptoms have been stable.   Hypertension  This is a chronic problem. The current episode started more than 1 year ago. The problem is unchanged. The problem is uncontrolled. Pertinent negatives include no anxiety, chest pain or shortness of breath. The current treatment provides mild improvement. Hypertensive end-organ damage includes CAD/MI. There is no history of angina. Identifiable causes of hypertension include sleep apnea and a thyroid problem. BP was high and was in ER and reports reviewed.    Review of Systems   Review of Systems   Constitutional: Negative.         Wt gain   HENT:  Positive for ear pain. Negative for ear discharge.    Respiratory: Negative.     Cardiovascular: Negative.    Gastrointestinal: Negative.  Negative for abdominal distention and abdominal pain.   Musculoskeletal: Negative.    Neurological: Negative.        Objective   /79 (BP Location: Right arm, Patient Position: Sitting, BP Cuff Size: Adult)   Pulse 78    "Temp 36.6 °C (97.8 °F) (Temporal)   Ht 1.778 m (5' 10\")   Wt 103 kg (227 lb)   BMI 32.57 kg/m²     Physical Exam  Vitals reviewed.   Constitutional:       Appearance: Normal appearance. He is obese.   HENT:      Head: Normocephalic and atraumatic.      Right Ear: Ear canal and external ear normal.      Left Ear: Ear canal and external ear normal.      Ears:      Comments: Tender left mastoid area  Eyes:      Conjunctiva/sclera: Conjunctivae normal.   Cardiovascular:      Rate and Rhythm: Normal rate and regular rhythm.   Pulmonary:      Effort: Pulmonary effort is normal.      Breath sounds: Normal breath sounds.   Abdominal:      Palpations: Abdomen is soft.   Musculoskeletal:         General: Normal range of motion.      Cervical back: Neck supple.   Skin:     General: Skin is warm and dry.   Neurological:      General: No focal deficit present.   Psychiatric:         Mood and Affect: Mood normal.       Assessment/Plan   Problem List Items Addressed This Visit             ICD-10-CM    COPD (chronic obstructive pulmonary disease) (CMS/HCC) J44.9    Essential hypertension I10    Hypothyroid E03.9    Obstructive sleep apnea, adult G47.33    CAD S/P percutaneous coronary angioplasty I25.10, Z98.61    Ischemic congestive cardiomyopathy (CMS/HCC) I25.5, I42.0    Other ventricular tachycardia (CMS/HCC) - Primary I47.29     Other Visit Diagnoses         Codes    Other cardiomyopathy (CMS/HCC)     I42.8        He has a 5 PCIs, he has a ejection fraction of 35%.  He is having pain and discomfort behind the left eye consistent with mastoiditis, fluoroquinolones was given for next few days.  Also he has been having bloating although I do not see any fluid buildup but he could be benefited by small dose of loop diuretic because of intermittent leg swelling from ischemic congestive cardiomyopathy.  He remains euthyroid clinically and chemically, he continued to use a CPAP on a consistent basis his compliance with the CPAP " has been persistent and consistent.  BP readings are stable, no angina, he is a former smoker, he has been using oxygen at night.  He will continue valsartan, hydralazine, inhaler treatment.  Cardiology follow-up was reviewed, no systemic evidence of significant decompensated CHF.  ICD check was reviewed, laboratories from emergency room were reviewed, follow-up in 3 months.

## 2024-03-26 ENCOUNTER — OFFICE VISIT (OUTPATIENT)
Dept: PRIMARY CARE | Facility: CLINIC | Age: 54
End: 2024-03-26
Payer: COMMERCIAL

## 2024-03-26 VITALS
BODY MASS INDEX: 31.5 KG/M2 | DIASTOLIC BLOOD PRESSURE: 90 MMHG | HEART RATE: 77 BPM | WEIGHT: 220 LBS | HEIGHT: 70 IN | TEMPERATURE: 96.9 F | SYSTOLIC BLOOD PRESSURE: 120 MMHG

## 2024-03-26 DIAGNOSIS — I42.8 OTHER CARDIOMYOPATHY (MULTI): ICD-10-CM

## 2024-03-26 DIAGNOSIS — R00.2 PALPITATIONS: ICD-10-CM

## 2024-03-26 DIAGNOSIS — I10 ESSENTIAL HYPERTENSION: ICD-10-CM

## 2024-03-26 DIAGNOSIS — H61.91 LESION OF RIGHT EXTERNAL EAR: ICD-10-CM

## 2024-03-26 DIAGNOSIS — E03.9 HYPOTHYROIDISM, UNSPECIFIED TYPE: ICD-10-CM

## 2024-03-26 DIAGNOSIS — G44.52 NEW DAILY PERSISTENT HEADACHE: Primary | ICD-10-CM

## 2024-03-26 DIAGNOSIS — I47.29 PAROXYSMAL VENTRICULAR TACHYCARDIA (MULTI): ICD-10-CM

## 2024-03-26 PROCEDURE — 3080F DIAST BP >= 90 MM HG: CPT | Performed by: INTERNAL MEDICINE

## 2024-03-26 PROCEDURE — 3074F SYST BP LT 130 MM HG: CPT | Performed by: INTERNAL MEDICINE

## 2024-03-26 PROCEDURE — 3008F BODY MASS INDEX DOCD: CPT | Performed by: INTERNAL MEDICINE

## 2024-03-26 PROCEDURE — 1036F TOBACCO NON-USER: CPT | Performed by: INTERNAL MEDICINE

## 2024-03-26 PROCEDURE — 99213 OFFICE O/P EST LOW 20 MIN: CPT | Performed by: INTERNAL MEDICINE

## 2024-03-26 RX ORDER — LEVOTHYROXINE SODIUM 200 UG/1
200 TABLET ORAL DAILY
Qty: 90 TABLET | Refills: 3 | Status: SHIPPED | OUTPATIENT
Start: 2024-03-26 | End: 2025-03-26

## 2024-03-26 RX ORDER — PREDNISONE 20 MG/1
40 TABLET ORAL DAILY
Qty: 10 TABLET | Refills: 0 | Status: SHIPPED | OUTPATIENT
Start: 2024-03-26 | End: 2024-03-31

## 2024-03-26 RX ORDER — VALSARTAN 160 MG/1
320 TABLET ORAL DAILY
Qty: 180 TABLET | Refills: 3 | Status: SHIPPED | OUTPATIENT
Start: 2024-03-26

## 2024-03-26 RX ORDER — LEVOFLOXACIN 500 MG/1
500 TABLET, FILM COATED ORAL DAILY
Qty: 5 TABLET | Refills: 0 | Status: SHIPPED | OUTPATIENT
Start: 2024-03-26 | End: 2024-03-31

## 2024-03-26 RX ORDER — CALCIUM CARBONATE/VITAMIN D3 500-10/5ML
400 LIQUID (ML) ORAL DAILY
Qty: 90 CAPSULE | Refills: 3 | Status: SHIPPED | OUTPATIENT
Start: 2024-03-26 | End: 2025-03-26

## 2024-03-26 ASSESSMENT — ENCOUNTER SYMPTOMS
CARDIOVASCULAR NEGATIVE: 1
CONSTITUTIONAL NEGATIVE: 1
HEADACHES: 1
ABDOMINAL PAIN: 0
PHOTOPHOBIA: 1
NAUSEA: 1
MIGRAINE HEADACHES: 1
NECK PAIN: 1
COUGH: 1
VOMITING: 0
FEVER: 0

## 2024-03-26 NOTE — PROGRESS NOTES
"Subjective   Patient ID: Hira Mojica is a 53 y.o. male who presents for Earache (Ear pain and eye pain since Saturday and growth behind right ear).    Earache   There is pain in the right ear. This is a new problem. The current episode started yesterday. The problem occurs constantly. The problem has been rapidly worsening. The maximum temperature recorded prior to his arrival was 100.4 - 100.9 F. The pain is at a severity of 7/10. Associated symptoms include coughing, headaches and neck pain. Pertinent negatives include no abdominal pain or vomiting.   Headache   This is a recurrent problem. The current episode started yesterday. The problem occurs constantly. The problem has been unchanged. The pain is located in the Bilateral and retro-orbital region. The pain radiates to the upper back. The pain quality is similar to prior headaches. The quality of the pain is described as pulsating. The pain is at a severity of 7/10. The pain is moderate. Associated symptoms include coughing, ear pain, nausea, neck pain and photophobia. Pertinent negatives include no abdominal pain, fever or vomiting. The treatment provided no relief. His past medical history is significant for hypertension, migraine headaches and sinus disease.        Review of Systems   Constitutional: Negative.  Negative for fever.   HENT:  Positive for ear pain.    Eyes:  Positive for photophobia.   Respiratory:  Positive for cough.    Cardiovascular: Negative.    Gastrointestinal:  Positive for nausea. Negative for abdominal pain and vomiting.   Musculoskeletal:  Positive for neck pain.   Skin: Negative.    Neurological:  Positive for headaches.       Objective   /90 (BP Location: Right arm, Patient Position: Sitting, BP Cuff Size: Adult)   Pulse 77   Temp 36.1 °C (96.9 °F) (Temporal)   Ht 1.778 m (5' 10\")   Wt 99.8 kg (220 lb)   BMI 31.57 kg/m²     Physical Exam  Vitals reviewed.   Constitutional:       General: He is not in acute " distress.     Appearance: Normal appearance. He is obese. He is not ill-appearing or diaphoretic.   HENT:      Head: Normocephalic and atraumatic.      Right Ear: Tympanic membrane, ear canal and external ear normal.   Eyes:      Conjunctiva/sclera: Conjunctivae normal.   Cardiovascular:      Rate and Rhythm: Normal rate and regular rhythm.      Pulses: Normal pulses.   Pulmonary:      Effort: Pulmonary effort is normal.      Breath sounds: Normal breath sounds.   Abdominal:      Palpations: Abdomen is soft.   Musculoskeletal:         General: Tenderness present.      Cervical back: Tenderness present. No rigidity.   Skin:     General: Skin is warm and dry.      Comments: Polypoid leison behind rt ear lobe   Neurological:      General: No focal deficit present.      Mental Status: He is oriented to person, place, and time.      Cranial Nerves: No cranial nerve deficit.      Sensory: No sensory deficit.   Psychiatric:         Mood and Affect: Mood normal.       Assessment/Plan   Problem List Items Addressed This Visit             ICD-10-CM    Essential hypertension I10    Relevant Medications    valsartan (Diovan) 160 mg tablet    Hypothyroid E03.9    Relevant Medications    levothyroxine (Synthroid, Levoxyl) 200 mcg tablet     Other Visit Diagnoses         Codes    New daily persistent headache    -  Primary G44.52    Paroxysmal ventricular tachycardia (CMS/HCC)     I47.29    Relevant Medications    magnesium oxide 400 mg magnesium capsule    Palpitations     R00.2    Relevant Medications    magnesium oxide 400 mg magnesium capsule    Other cardiomyopathy (CMS/HCC)     I42.8    Relevant Medications    valsartan (Diovan) 160 mg tablet        Few days ago I saw him and he was having tenderness on the left mastoid region gave him some antibacterial now he says that since yesterday he has a headache, in month of January he was in the emergency room with headache, at that time CT of brain was done, nothing was obvious on  a plain CT but this headache keeps on happening when carefully inquired it is a bifrontal headache he feels like pulsating headache behind the orbits now he has a 5 PCI so I am afraid to give triptans, new drugs like Ubrelvy will not be approved that is really through his insurance.  Now he feels like his sinuses are intensely painful behind the orbits, so is it her incidence of sinusitis, is it a migraine headache or vascular headache, he does not have any nuchal rigidity.  He has some sensitivity to the light, I told him to take 4 ibuprofen 200 mg each that is 800 mg, as soon as he goes home with some sort of meal intake.  We will give a short course of steroid again, there is a tenderness on the nape of the neck bilateral and also there is a tenderness on the right mastoid region but the otoscopic exam is negative eye exam is negative, pupils are reacting, looks like it is acute vascular headache or headache related to the sinus congestion or sinusitis.  BP readings are stable, palpitations are not there, there is no evidence of any V. tach, he remains euthyroid clinically and chemically, he has this lesion on the right external ear for which plastic surgery referral was given, it is headache becomes intractable with the photosensitivity and nausea vomiting that he has to go to the emergency room because he may require lumbar puncture I told him.

## 2024-04-15 ENCOUNTER — HOSPITAL ENCOUNTER (OUTPATIENT)
Dept: CARDIOLOGY | Facility: HOSPITAL | Age: 54
Discharge: HOME | End: 2024-04-15
Payer: COMMERCIAL

## 2024-04-15 DIAGNOSIS — I47.29 PAROXYSMAL VENTRICULAR TACHYCARDIA (MULTI): ICD-10-CM

## 2024-04-15 DIAGNOSIS — Z95.810 PRESENCE OF AUTOMATIC CARDIOVERTER/DEFIBRILLATOR (AICD): ICD-10-CM

## 2024-04-18 ENCOUNTER — HOSPITAL ENCOUNTER (OUTPATIENT)
Dept: CARDIOLOGY | Facility: HOSPITAL | Age: 54
Discharge: HOME | End: 2024-04-18
Payer: COMMERCIAL

## 2024-04-18 ENCOUNTER — HOSPITAL ENCOUNTER (OUTPATIENT)
Dept: RADIOLOGY | Facility: HOSPITAL | Age: 54
Discharge: HOME | End: 2024-04-18
Payer: COMMERCIAL

## 2024-04-18 DIAGNOSIS — Z95.810 PRESENCE OF AUTOMATIC CARDIOVERTER/DEFIBRILLATOR (AICD): ICD-10-CM

## 2024-04-18 DIAGNOSIS — I47.29 PAROXYSMAL VENTRICULAR TACHYCARDIA (MULTI): ICD-10-CM

## 2024-04-18 DIAGNOSIS — G44.52 NEW DAILY PERSISTENT HEADACHE: ICD-10-CM

## 2024-04-18 PROCEDURE — 70553 MRI BRAIN STEM W/O & W/DYE: CPT

## 2024-04-18 PROCEDURE — 93287 PERI-PX DEVICE EVAL & PRGR: CPT | Mod: 59

## 2024-04-18 PROCEDURE — 93287 PERI-PX DEVICE EVAL & PRGR: CPT | Performed by: INTERNAL MEDICINE

## 2024-04-18 PROCEDURE — 2550000001 HC RX 255 CONTRASTS: Performed by: INTERNAL MEDICINE

## 2024-04-18 PROCEDURE — 93287 PERI-PX DEVICE EVAL & PRGR: CPT

## 2024-04-18 PROCEDURE — 70553 MRI BRAIN STEM W/O & W/DYE: CPT | Performed by: RADIOLOGY

## 2024-04-18 PROCEDURE — A9575 INJ GADOTERATE MEGLUMI 0.1ML: HCPCS | Performed by: INTERNAL MEDICINE

## 2024-04-18 RX ORDER — GADOTERATE MEGLUMINE 376.9 MG/ML
0.2 INJECTION INTRAVENOUS
Status: COMPLETED | OUTPATIENT
Start: 2024-04-18 | End: 2024-04-18

## 2024-04-18 RX ADMIN — GADOTERATE MEGLUMINE 20 ML: 376.9 INJECTION INTRAVENOUS at 11:36

## 2024-04-18 NOTE — NURSING NOTE
Pt in the MRI suite for MRI. Pacer clinic put pt in MRI safe mode. Pt monitored during entire MRI. Uneventful MRI. Pacer clinic put the pt back in original mode. Pt tolerated well.

## 2024-04-23 ENCOUNTER — APPOINTMENT (OUTPATIENT)
Dept: CARDIOLOGY | Facility: HOSPITAL | Age: 54
End: 2024-04-23
Payer: COMMERCIAL

## 2024-04-23 ENCOUNTER — APPOINTMENT (OUTPATIENT)
Dept: CARDIOLOGY | Facility: CLINIC | Age: 54
End: 2024-04-23
Payer: COMMERCIAL

## 2024-04-29 ENCOUNTER — HOSPITAL ENCOUNTER (OUTPATIENT)
Dept: CARDIOLOGY | Facility: HOSPITAL | Age: 54
Discharge: HOME | End: 2024-04-29
Payer: COMMERCIAL

## 2024-04-29 DIAGNOSIS — Z95.810 PRESENCE OF AUTOMATIC CARDIOVERTER/DEFIBRILLATOR (AICD): ICD-10-CM

## 2024-04-29 DIAGNOSIS — I47.29 PAROXYSMAL VENTRICULAR TACHYCARDIA (MULTI): ICD-10-CM

## 2024-04-29 PROBLEM — R07.9 CHEST PAIN: Status: ACTIVE | Noted: 2018-03-23

## 2024-04-29 PROBLEM — R22.30 MASS OF WRIST: Status: ACTIVE | Noted: 2023-08-30

## 2024-04-29 PROBLEM — K64.5 THROMBOSED EXTERNAL HEMORRHOIDS: Status: ACTIVE | Noted: 2018-07-31

## 2024-04-29 PROBLEM — K40.91 RECURRENT RIGHT INGUINAL HERNIA: Status: ACTIVE | Noted: 2024-04-29

## 2024-04-29 PROBLEM — Z86.39 HISTORY OF THYROID DISORDER: Status: ACTIVE | Noted: 2024-04-29

## 2024-04-29 PROBLEM — I42.9 CARDIOMYOPATHY (MULTI): Status: ACTIVE | Noted: 2023-01-16

## 2024-04-29 PROBLEM — R11.2 NAUSEA, VOMITING, AND DIARRHEA: Status: ACTIVE | Noted: 2018-12-26

## 2024-04-29 PROBLEM — N40.0 ENLARGED PROSTATE: Status: ACTIVE | Noted: 2023-04-28

## 2024-04-29 PROBLEM — K57.32 DIVERTICULITIS OF SIGMOID COLON: Status: ACTIVE | Noted: 2023-08-01

## 2024-04-29 PROBLEM — R10.9 ABDOMINAL PAIN: Status: ACTIVE | Noted: 2023-08-01

## 2024-04-29 PROBLEM — M25.559 ARTHRALGIA OF HIP: Status: ACTIVE | Noted: 2024-04-29

## 2024-04-29 PROBLEM — I31.39 PERICARDIAL EFFUSION (HHS-HCC): Status: ACTIVE | Noted: 2024-04-29

## 2024-04-29 PROBLEM — S93.401A SPRAIN OF RIGHT ANKLE: Status: ACTIVE | Noted: 2023-09-29

## 2024-04-29 PROBLEM — R19.7 NAUSEA, VOMITING, AND DIARRHEA: Status: ACTIVE | Noted: 2018-12-26

## 2024-04-29 PROBLEM — L02.419 ABSCESS OF AXILLA: Status: ACTIVE | Noted: 2024-04-29

## 2024-04-29 PROBLEM — R51.9 HEADACHE: Status: ACTIVE | Noted: 2018-11-07
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
## 2024-04-29 PROBLEM — I25.10 ARTERIOSCLEROSIS OF CORONARY ARTERY: Status: ACTIVE | Noted: 2023-01-16

## 2024-04-29 PROBLEM — Z91.199 NONCOMPLIANCE WITH TREATMENT: Status: ACTIVE | Noted: 2023-08-30

## 2024-04-29 PROBLEM — H70.92 MASTOIDITIS OF LEFT SIDE: Status: ACTIVE | Noted: 2024-04-29

## 2024-04-29 PROBLEM — R06.09 DYSPNEA ON EXERTION: Status: ACTIVE | Noted: 2023-08-30

## 2024-04-29 PROBLEM — R74.8 ELEVATED LIVER ENZYMES: Status: ACTIVE | Noted: 2023-08-30

## 2024-04-29 PROBLEM — M25.569 KNEE PAIN: Status: ACTIVE | Noted: 2024-04-29

## 2024-04-29 PROBLEM — I47.20 PAROXYSMAL VENTRICULAR TACHYCARDIA: Status: ACTIVE | Noted: 2023-09-03

## 2024-04-29 PROBLEM — N45.1 EPIDIDYMITIS: Status: ACTIVE | Noted: 2023-08-30

## 2024-04-29 PROBLEM — E66.9 OBESITY: Status: ACTIVE | Noted: 2024-01-23

## 2024-04-29 PROBLEM — I15.9 SECONDARY HYPERTENSION: Status: ACTIVE | Noted: 2024-04-29

## 2024-04-29 PROBLEM — H61.90 LESION OF EXTERNAL EAR: Status: ACTIVE | Noted: 2024-04-29

## 2024-04-29 PROBLEM — N30.00 ACUTE CYSTITIS: Status: ACTIVE | Noted: 2023-08-30

## 2024-04-29 PROBLEM — G44.52 NEW DAILY PERSISTENT HEADACHE: Status: ACTIVE | Noted: 2024-04-29

## 2024-04-29 PROBLEM — T23.279A: Status: ACTIVE | Noted: 2023-06-07

## 2024-04-29 PROBLEM — S61.217A LACERATION OF LEFT LITTLE FINGER: Status: ACTIVE | Noted: 2024-04-29

## 2024-04-29 PROBLEM — R61 GENERALIZED HYPERHIDROSIS: Status: ACTIVE | Noted: 2023-02-22

## 2024-04-29 PROBLEM — I50.22 CHRONIC SYSTOLIC HEART FAILURE (MULTI): Status: ACTIVE | Noted: 2023-06-29

## 2024-04-29 PROBLEM — M54.10 RADICULITIS: Status: ACTIVE | Noted: 2023-08-30

## 2024-04-29 PROBLEM — R05.8 OTHER SPECIFIED COUGH: Status: ACTIVE | Noted: 2018-11-07

## 2024-04-29 PROBLEM — R00.1 BRADYCARDIA: Status: ACTIVE | Noted: 2023-06-29

## 2024-04-29 PROBLEM — K59.09 CHRONIC CONSTIPATION: Status: ACTIVE | Noted: 2024-04-29

## 2024-04-29 PROBLEM — R31.9 HEMATURIA: Status: ACTIVE | Noted: 2024-04-29

## 2024-04-29 PROBLEM — J01.00 ACUTE MAXILLARY SINUSITIS: Status: ACTIVE | Noted: 2018-11-07

## 2024-04-29 PROBLEM — L72.3 SEBACEOUS CYST: Status: ACTIVE | Noted: 2018-06-22

## 2024-04-29 PROBLEM — Z20.822 CONTACT WITH AND (SUSPECTED) EXPOSURE TO COVID-19: Status: ACTIVE | Noted: 2023-02-22

## 2024-04-29 PROBLEM — M75.120 COMPLETE TEAR OF ROTATOR CUFF: Status: ACTIVE | Noted: 2023-08-30

## 2024-04-29 PROBLEM — K21.9 GASTROESOPHAGEAL REFLUX DISEASE WITHOUT ESOPHAGITIS: Status: ACTIVE | Noted: 2023-06-29

## 2024-04-29 PROCEDURE — 93295 DEV INTERROG REMOTE 1/2/MLT: CPT | Performed by: INTERNAL MEDICINE

## 2024-04-29 PROCEDURE — 93296 REM INTERROG EVL PM/IDS: CPT

## 2024-05-14 ENCOUNTER — OFFICE VISIT (OUTPATIENT)
Dept: CARDIOLOGY | Facility: CLINIC | Age: 54
End: 2024-05-14
Payer: COMMERCIAL

## 2024-05-14 ENCOUNTER — HOSPITAL ENCOUNTER (OUTPATIENT)
Dept: CARDIOLOGY | Facility: HOSPITAL | Age: 54
Discharge: HOME | End: 2024-05-14
Payer: COMMERCIAL

## 2024-05-14 VITALS
WEIGHT: 223 LBS | BODY MASS INDEX: 31.92 KG/M2 | SYSTOLIC BLOOD PRESSURE: 130 MMHG | HEIGHT: 70 IN | DIASTOLIC BLOOD PRESSURE: 70 MMHG | HEART RATE: 60 BPM

## 2024-05-14 DIAGNOSIS — R60.0 LOCALIZED EDEMA: ICD-10-CM

## 2024-05-14 DIAGNOSIS — R06.09 DYSPNEA ON EXERTION: ICD-10-CM

## 2024-05-14 DIAGNOSIS — I20.9 ANGINA, CLASS III (CMS-HCC): ICD-10-CM

## 2024-05-14 DIAGNOSIS — I25.10 ARTERIOSCLEROSIS OF CORONARY ARTERY: ICD-10-CM

## 2024-05-14 DIAGNOSIS — I10 ESSENTIAL HYPERTENSION: ICD-10-CM

## 2024-05-14 DIAGNOSIS — I50.22 CHRONIC SYSTOLIC HEART FAILURE (MULTI): ICD-10-CM

## 2024-05-14 DIAGNOSIS — Z95.810 CARDIAC DEFIBRILLATOR IN PLACE: ICD-10-CM

## 2024-05-14 DIAGNOSIS — E78.2 MIXED HYPERLIPIDEMIA: ICD-10-CM

## 2024-05-14 DIAGNOSIS — Z87.891 FORMER CIGARETTE SMOKER: ICD-10-CM

## 2024-05-14 DIAGNOSIS — G47.33 OBSTRUCTIVE SLEEP APNEA, ADULT: ICD-10-CM

## 2024-05-14 DIAGNOSIS — Z01.812 PRE-PROCEDURE LAB EXAM: ICD-10-CM

## 2024-05-14 DIAGNOSIS — I25.5 ISCHEMIC CARDIOMYOPATHY: ICD-10-CM

## 2024-05-14 PROCEDURE — 93000 ELECTROCARDIOGRAM COMPLETE: CPT | Performed by: INTERNAL MEDICINE

## 2024-05-14 PROCEDURE — 93283 PRGRMG EVAL IMPLANTABLE DFB: CPT | Performed by: INTERNAL MEDICINE

## 2024-05-14 PROCEDURE — 99215 OFFICE O/P EST HI 40 MIN: CPT | Performed by: INTERNAL MEDICINE

## 2024-05-14 PROCEDURE — 93283 PRGRMG EVAL IMPLANTABLE DFB: CPT

## 2024-05-14 PROCEDURE — 3008F BODY MASS INDEX DOCD: CPT | Performed by: INTERNAL MEDICINE

## 2024-05-14 PROCEDURE — 1036F TOBACCO NON-USER: CPT | Performed by: INTERNAL MEDICINE

## 2024-05-14 PROCEDURE — 3078F DIAST BP <80 MM HG: CPT | Performed by: INTERNAL MEDICINE

## 2024-05-14 PROCEDURE — 3075F SYST BP GE 130 - 139MM HG: CPT | Performed by: INTERNAL MEDICINE

## 2024-05-14 ASSESSMENT — ENCOUNTER SYMPTOMS
SHORTNESS OF BREATH: 1
SYNCOPE: 0
DYSPNEA ON EXERTION: 1
IRREGULAR HEARTBEAT: 0
NEAR-SYNCOPE: 0

## 2024-05-14 NOTE — PROGRESS NOTES
"Chief Complaint:   Follow-up (6 month w/ device check)     History Of Present Illness:    Hira Mojica is a 54 y.o. male presenting with follow-up.     He is having angina and shortness of breath with mild activity.  If he is carrying something or if he is walking downstairs, he has symptoms.  He is to stop what he is doing then he recovers over time.    Eyes any syncope or palpitation.    Last Recorded Vitals:  Vitals:    05/14/24 1234   BP: 130/70   BP Location: Right arm   Patient Position: Sitting   Pulse: 60   Weight: 101 kg (223 lb)   Height: 1.778 m (5' 10\")       Past Medical History:  He has a past medical history of COPD (chronic obstructive pulmonary disease) (Multi) and Hyperlipidemia.    Past Surgical History:  He has a past surgical history that includes Hernia repair (01/23/2017); Rotator cuff repair (01/23/2017); Appendectomy (01/23/2017); Cholecystectomy (01/23/2017); Total thyroidectomy (01/23/2017); Other surgical history (05/10/2021); Other surgical history (05/10/2021); Other surgical history (06/07/2021); Other surgical history (07/27/2022); Other surgical history (07/27/2022); Other surgical history (07/27/2022); Other surgical history (07/27/2022); Hernia repair (02/16/2017); Cardiac catheterization; Coronary stent placement; Insert / replace / remove pacemaker; and Coronary angioplasty.      Social History:  He reports that he quit smoking about 11 years ago. His smoking use included cigarettes. He started smoking about 31 years ago. He has a 60 pack-year smoking history. He has never used smokeless tobacco. He reports that he does not currently use alcohol. He reports that he does not use drugs.    Family History:  Family History   Problem Relation Name Age of Onset    Other (arteriosclerotic CVD) Mother Telma     Other (cardiac disorder) Mother Telma     Stroke Mother Telma     Cancer Mother Telma     Colon cancer Mother Telma         had colonectomy which seemed to stop the spread    " Abnormal EKG Mother Telma     Angina Mother Telma     Arrhythmia Mother Telma     Atrial fibrillation Mother Telma     Diabetes type II Mother Telma     Heart attack Mother Telma     Heart failure Mother Telma     Hyperlipidemia Mother Telma     Hypertension Mother Telma     Thyroid disease Mother Telma     Other (arteriosclerotic CVD) Father DARCI     Stroke Father DARCI     Cancer Father DARCI     Asthma Father DARCI     Hypertension Father DARCI     Lung disease Father DARCI     Other (arteriosclerotic CVD) Brother russ     Heart attack Brother russ     Heart disease Brother russ     Obesity Brother russ     Thyroid disease Mother's Sister reyes     Other (cardiac disorder) Other grandmother     Colon cancer Other grandmother         was too late for any remedies and passed away within a month of dx    Cancer Other aunt         Allergies:  Penicillins and Ketorolac    Outpatient Medications:  Current Outpatient Medications   Medication Instructions    albuterol 90 mcg/actuation inhaler 1-2 puffs, inhalation, 4 times daily    aspirin 81 mg EC tablet 1 tablet, oral, Daily    atorvastatin (LIPITOR) 80 mg, oral, Nightly    azelastine (Astelin) 137 mcg (0.1 %) nasal spray 1 spray, Each Nostril, 2 times daily, Use in each nostril as directed    budesonide-formoteroL (Symbicort) 160-4.5 mcg/actuation inhaler 2 puffs, inhalation, 2 times daily, RINSE MOUTH AFTER USE.    carvedilol (COREG) 25 mg, oral, 2 times daily    Dulera 200-5 mcg/actuation inhaler 2 puffs, inhalation, 2 times daily RT, Rinse mouth with water after use to reduce aftertaste and incidence of candidiasis. Do not swallow.    hydrALAZINE (APRESOLINE) 50 mg, oral, 2 times daily    isosorbide mononitrate ER (Imdur) 30 mg 24 hr tablet 1 tablet, oral, Daily, In morning    levothyroxine (SYNTHROID, LEVOXYL) 200 mcg, oral, Daily    magnesium oxide 400 mg, oral, Daily    nitroglycerin (Nitrostat) 0.4 mg SL tablet sublingual, PLACE 1 TABLET UNDER THE TONGUE EVERY 5 MINUTES FOR UP  TO 3 DOSES AS NEEDED FOR CHEST PAIN.CALL 911 IF PAIN PERSISTS.    oxygen (O2) gas therapy 3 L NC at HS    torsemide (DEMADEX) 20 mg, oral, Daily    valsartan (DIOVAN) 320 mg, oral, Daily         Review of Systems   Constitutional: Positive for malaise/fatigue.   Cardiovascular:  Positive for chest pain and dyspnea on exertion. Negative for irregular heartbeat, near-syncope and syncope.   Respiratory:  Positive for shortness of breath.    All other systems reviewed and are negative.      Physical Exam  Cardiovascular:      Rate and Rhythm: Normal rate.      Pulses: Normal pulses.      Heart sounds: Normal heart sounds.      Comments: Left side device  Pulmonary:      Effort: Pulmonary effort is normal.   Neurological:      General: No focal deficit present.      Mental Status: He is alert.            Last Labs:  CBC -  Lab Results   Component Value Date    WBC 9.3 01/30/2024    HGB 15.3 01/30/2024    HCT 43.6 01/30/2024     01/30/2024     01/30/2024       CMP -  Lab Results   Component Value Date    CALCIUM 9.2 01/30/2024    PHOS 2.7 09/26/2019    PROT 7.0 01/30/2024    ALBUMIN 4.3 01/30/2024    AST 20 01/30/2024    ALT 17 01/30/2024    ALKPHOS 51 01/30/2024    BILITOT 0.7 01/30/2024       LIPID PANEL -   Lab Results   Component Value Date    CHOL 218 (H) 11/13/2023    TRIG 80 11/13/2023    HDL 46.1 11/13/2023    CHHDL 4.7 11/13/2023    LDLF 206 (H) 09/26/2019    VLDL 16 11/13/2023    NHDL 172 (H) 11/13/2023       RENAL FUNCTION PANEL -   Lab Results   Component Value Date    GLUCOSE 96 01/30/2024     01/30/2024    K 3.8 01/30/2024     01/30/2024    CO2 27 01/30/2024    ANIONGAP 10 01/30/2024    BUN 18 01/30/2024    CREATININE 0.98 01/30/2024    GFRMALE 90 08/01/2023    CALCIUM 9.2 01/30/2024    PHOS 2.7 09/26/2019    ALBUMIN 4.3 01/30/2024        Lab Results   Component Value Date    BNP 97 11/08/2023       Last Cardiology Tests:  ECG:  Today.  Appropriate pacing.  Normal axis.  Corrected  QT interval 420 ms    Device check today.  [employer] CD DR CUMMINS 500 Q ICD.  Estimate longevity device over 8 years.  3 episodes of nonsustained tachycardia.  0 A-fib.  52% atrial pacing.      Lab review: I have reviewed -see above    Assessment/Plan   Problem List Items Addressed This Visit       Essential hypertension    Hyperlipidemia    Obstructive sleep apnea, adult    Cardiac defibrillator in place    Former cigarette smoker    Arteriosclerosis of coronary artery    Cardiomyopathy (Multi)    Chronic systolic heart failure (Multi)    Dyspnea on exertion    Angina, class III (CMS-HCC)         Lisa Hurst MD    Primary prevention ICD for ischemic cardiomyopathy and ejection fraction 35% despite optimal heart failure medications. Status post revascularization with no improvement noted ejection fraction. Sinus bradycardia.   [employer] CD DR cummins 500 Q ICD.  Reviewed device check with patient.  Discussed follow-up and standard of care for device clinic.  All questions answered.  Ordered device checks.  Exertional dyspnea and angina with known  coronary artery disease.  Also with recurrent nonsustained ventricular tachycardia.  Given change in symptoms, class III CCS anginal equivalent, recommend cardiac catheterization to evaluate coronary anatomy.    Ischemic cardiomyopathy. NYHA IIb heart failure. LVEF 30% with no improvement of ejection fraction after optimal guided medical therapy. Reviewed medications. Continue medications.  Deana refills.  Hypertension, chronic. Stable. Reviewed meds. Continue meds. Refills.  Obstructive sleep apnea.  Discussed association with arrhythmias and sleep apnea  Overweight.  AHA recommendations for exercise, diet, and behavioral modification reviewed with pt.    Counseling greater than 50% of the visit.  The patient and I discussed the mechanism of arrhythmia, primary prevention ICD, exertional chest discomfort and angina, what would indications be for cath, he agrees to  catheterization, defibrillator follow-up, ECG, device clinic, indications for and types of medications, discussion if and what medication refills needed, treatment options, risks, benefits, and imponderables. American Heart Association lifestyle changes and behavioral modification discussed. All questions answered in detail.  Patient appreciative of care.

## 2024-05-20 ENCOUNTER — LAB (OUTPATIENT)
Dept: LAB | Facility: LAB | Age: 54
End: 2024-05-20
Payer: COMMERCIAL

## 2024-05-20 DIAGNOSIS — Z01.812 PRE-PROCEDURE LAB EXAM: ICD-10-CM

## 2024-05-20 DIAGNOSIS — I20.9 ANGINA, CLASS III (CMS-HCC): ICD-10-CM

## 2024-05-20 LAB
ANION GAP SERPL CALC-SCNC: 12 MMOL/L (ref 10–20)
BUN SERPL-MCNC: 21 MG/DL (ref 6–23)
CALCIUM SERPL-MCNC: 9.5 MG/DL (ref 8.6–10.3)
CHLORIDE SERPL-SCNC: 109 MMOL/L (ref 98–107)
CO2 SERPL-SCNC: 24 MMOL/L (ref 21–32)
CREAT SERPL-MCNC: 1.13 MG/DL (ref 0.5–1.3)
EGFRCR SERPLBLD CKD-EPI 2021: 77 ML/MIN/1.73M*2
ERYTHROCYTE [DISTWIDTH] IN BLOOD BY AUTOMATED COUNT: 13.2 % (ref 11.5–14.5)
GLUCOSE SERPL-MCNC: 84 MG/DL (ref 74–99)
HCT VFR BLD AUTO: 42.6 % (ref 41–52)
HGB BLD-MCNC: 14.7 G/DL (ref 13.5–17.5)
MCH RBC QN AUTO: 35.9 PG (ref 26–34)
MCHC RBC AUTO-ENTMCNC: 34.5 G/DL (ref 32–36)
MCV RBC AUTO: 104 FL (ref 80–100)
NRBC BLD-RTO: 0 /100 WBCS (ref 0–0)
PLATELET # BLD AUTO: 234 X10*3/UL (ref 150–450)
POTASSIUM SERPL-SCNC: 4.2 MMOL/L (ref 3.5–5.3)
RBC # BLD AUTO: 4.1 X10*6/UL (ref 4.5–5.9)
SODIUM SERPL-SCNC: 141 MMOL/L (ref 136–145)
WBC # BLD AUTO: 9.2 X10*3/UL (ref 4.4–11.3)

## 2024-05-20 PROCEDURE — 36415 COLL VENOUS BLD VENIPUNCTURE: CPT

## 2024-05-20 PROCEDURE — 80048 BASIC METABOLIC PNL TOTAL CA: CPT

## 2024-05-20 PROCEDURE — 85027 COMPLETE CBC AUTOMATED: CPT

## 2024-05-21 RX ORDER — ASPIRIN 325 MG
325 TABLET ORAL ONCE
Status: CANCELLED | OUTPATIENT
Start: 2024-05-21 | End: 2024-05-21

## 2024-05-22 ENCOUNTER — APPOINTMENT (OUTPATIENT)
Dept: CARDIOLOGY | Facility: HOSPITAL | Age: 54
End: 2024-05-22
Payer: COMMERCIAL

## 2024-05-22 ENCOUNTER — HOSPITAL ENCOUNTER (OUTPATIENT)
Facility: HOSPITAL | Age: 54
Setting detail: OUTPATIENT SURGERY
Discharge: HOME | End: 2024-05-22
Attending: INTERNAL MEDICINE | Admitting: INTERNAL MEDICINE
Payer: COMMERCIAL

## 2024-05-22 ENCOUNTER — HOSPITAL ENCOUNTER (OUTPATIENT)
Dept: CARDIOLOGY | Facility: HOSPITAL | Age: 54
Setting detail: OUTPATIENT SURGERY
Discharge: HOME | End: 2024-05-22
Payer: COMMERCIAL

## 2024-05-22 VITALS
SYSTOLIC BLOOD PRESSURE: 147 MMHG | WEIGHT: 218.92 LBS | TEMPERATURE: 97.9 F | DIASTOLIC BLOOD PRESSURE: 95 MMHG | BODY MASS INDEX: 31.34 KG/M2 | OXYGEN SATURATION: 100 % | HEART RATE: 60 BPM | HEIGHT: 70 IN | RESPIRATION RATE: 12 BRPM

## 2024-05-22 DIAGNOSIS — I20.9 ANGINA, CLASS III (CMS-HCC): Primary | ICD-10-CM

## 2024-05-22 DIAGNOSIS — I25.10 ARTERIOSCLEROSIS OF CORONARY ARTERY: ICD-10-CM

## 2024-05-22 DIAGNOSIS — Z98.61 POST PTCA: ICD-10-CM

## 2024-05-22 PROCEDURE — 2500000004 HC RX 250 GENERAL PHARMACY W/ HCPCS (ALT 636 FOR OP/ED): Performed by: NURSE PRACTITIONER

## 2024-05-22 PROCEDURE — C1769 GUIDE WIRE: HCPCS | Performed by: INTERNAL MEDICINE

## 2024-05-22 PROCEDURE — C1887 CATHETER, GUIDING: HCPCS | Performed by: INTERNAL MEDICINE

## 2024-05-22 PROCEDURE — 93005 ELECTROCARDIOGRAM TRACING: CPT | Mod: 59

## 2024-05-22 PROCEDURE — 2550000001 HC RX 255 CONTRASTS: Performed by: INTERNAL MEDICINE

## 2024-05-22 PROCEDURE — 93458 L HRT ARTERY/VENTRICLE ANGIO: CPT | Performed by: INTERNAL MEDICINE

## 2024-05-22 PROCEDURE — 99152 MOD SED SAME PHYS/QHP 5/>YRS: CPT | Performed by: INTERNAL MEDICINE

## 2024-05-22 PROCEDURE — C1725 CATH, TRANSLUMIN NON-LASER: HCPCS | Performed by: INTERNAL MEDICINE

## 2024-05-22 PROCEDURE — 93010 ELECTROCARDIOGRAM REPORT: CPT | Performed by: INTERNAL MEDICINE

## 2024-05-22 PROCEDURE — 99153 MOD SED SAME PHYS/QHP EA: CPT | Performed by: INTERNAL MEDICINE

## 2024-05-22 PROCEDURE — 2500000004 HC RX 250 GENERAL PHARMACY W/ HCPCS (ALT 636 FOR OP/ED): Performed by: INTERNAL MEDICINE

## 2024-05-22 PROCEDURE — 85347 COAGULATION TIME ACTIVATED: CPT

## 2024-05-22 PROCEDURE — C1874 STENT, COATED/COV W/DEL SYS: HCPCS | Performed by: INTERNAL MEDICINE

## 2024-05-22 PROCEDURE — 2720000007 HC OR 272 NO HCPCS: Performed by: INTERNAL MEDICINE

## 2024-05-22 PROCEDURE — 2780000003 HC OR 278 NO HCPCS: Performed by: INTERNAL MEDICINE

## 2024-05-22 PROCEDURE — C1894 INTRO/SHEATH, NON-LASER: HCPCS | Performed by: INTERNAL MEDICINE

## 2024-05-22 PROCEDURE — 96373 THER/PROPH/DIAG INJ IA: CPT | Performed by: INTERNAL MEDICINE

## 2024-05-22 PROCEDURE — 92928 PRQ TCAT PLMT NTRAC ST 1 LES: CPT | Performed by: INTERNAL MEDICINE

## 2024-05-22 PROCEDURE — 2500000001 HC RX 250 WO HCPCS SELF ADMINISTERED DRUGS (ALT 637 FOR MEDICARE OP): Performed by: INTERNAL MEDICINE

## 2024-05-22 PROCEDURE — 85347 COAGULATION TIME ACTIVATED: CPT | Performed by: INTERNAL MEDICINE

## 2024-05-22 PROCEDURE — C9600 PERC DRUG-EL COR STENT SING: HCPCS | Performed by: INTERNAL MEDICINE

## 2024-05-22 PROCEDURE — 2500000005 HC RX 250 GENERAL PHARMACY W/O HCPCS: Performed by: INTERNAL MEDICINE

## 2024-05-22 PROCEDURE — 7100000009 HC PHASE TWO TIME - INITIAL BASE CHARGE: Performed by: INTERNAL MEDICINE

## 2024-05-22 PROCEDURE — 7100000010 HC PHASE TWO TIME - EACH INCREMENTAL 1 MINUTE: Performed by: INTERNAL MEDICINE

## 2024-05-22 DEVICE — STENT ONYXNG30022UX ONYX 3.00X22RX
Type: IMPLANTABLE DEVICE | Site: CORONARY | Status: FUNCTIONAL
Brand: ONYX FRONTIER™

## 2024-05-22 RX ORDER — NITROGLYCERIN 0.4 MG/1
0.4 TABLET SUBLINGUAL EVERY 5 MIN PRN
Status: DISCONTINUED | OUTPATIENT
Start: 2024-05-22 | End: 2024-05-22 | Stop reason: HOSPADM

## 2024-05-22 RX ORDER — SODIUM CHLORIDE 9 MG/ML
50 INJECTION, SOLUTION INTRAVENOUS CONTINUOUS
Status: DISCONTINUED | OUTPATIENT
Start: 2024-05-22 | End: 2024-05-22 | Stop reason: HOSPADM

## 2024-05-22 RX ORDER — MORPHINE SULFATE 4 MG/ML
INJECTION, SOLUTION INTRAMUSCULAR; INTRAVENOUS AS NEEDED
Status: DISCONTINUED | OUTPATIENT
Start: 2024-05-22 | End: 2024-05-22 | Stop reason: HOSPADM

## 2024-05-22 RX ORDER — ACETAMINOPHEN 325 MG/1
650 TABLET ORAL EVERY 6 HOURS PRN
Status: DISCONTINUED | OUTPATIENT
Start: 2024-05-22 | End: 2024-05-22 | Stop reason: HOSPADM

## 2024-05-22 RX ORDER — SODIUM CHLORIDE 9 MG/ML
100 INJECTION, SOLUTION INTRAVENOUS CONTINUOUS
Status: DISCONTINUED | OUTPATIENT
Start: 2024-05-22 | End: 2024-05-22

## 2024-05-22 RX ORDER — MIDAZOLAM HYDROCHLORIDE 1 MG/ML
INJECTION, SOLUTION INTRAMUSCULAR; INTRAVENOUS AS NEEDED
Status: DISCONTINUED | OUTPATIENT
Start: 2024-05-22 | End: 2024-05-22 | Stop reason: HOSPADM

## 2024-05-22 RX ORDER — CLOPIDOGREL BISULFATE 75 MG/1
75 TABLET ORAL DAILY
Qty: 30 TABLET | Refills: 11 | Status: SHIPPED | OUTPATIENT
Start: 2024-05-22

## 2024-05-22 RX ORDER — NITROGLYCERIN 0.4 MG/1
TABLET SUBLINGUAL AS NEEDED
Status: DISCONTINUED | OUTPATIENT
Start: 2024-05-22 | End: 2024-05-22 | Stop reason: HOSPADM

## 2024-05-22 RX ORDER — ALUMINUM HYDROXIDE, MAGNESIUM HYDROXIDE, AND SIMETHICONE 1200; 120; 1200 MG/30ML; MG/30ML; MG/30ML
30 SUSPENSION ORAL 4 TIMES DAILY PRN
Status: DISCONTINUED | OUTPATIENT
Start: 2024-05-22 | End: 2024-05-22 | Stop reason: HOSPADM

## 2024-05-22 RX ORDER — NITROGLYCERIN 40 MG/100ML
INJECTION INTRAVENOUS AS NEEDED
Status: DISCONTINUED | OUTPATIENT
Start: 2024-05-22 | End: 2024-05-22 | Stop reason: HOSPADM

## 2024-05-22 RX ORDER — LIDOCAINE HYDROCHLORIDE 20 MG/ML
INJECTION, SOLUTION INFILTRATION; PERINEURAL AS NEEDED
Status: DISCONTINUED | OUTPATIENT
Start: 2024-05-22 | End: 2024-05-22 | Stop reason: HOSPADM

## 2024-05-22 RX ORDER — CLOPIDOGREL BISULFATE 300 MG/1
TABLET, FILM COATED ORAL AS NEEDED
Status: DISCONTINUED | OUTPATIENT
Start: 2024-05-22 | End: 2024-05-22 | Stop reason: HOSPADM

## 2024-05-22 RX ORDER — FENTANYL CITRATE 50 UG/ML
INJECTION, SOLUTION INTRAMUSCULAR; INTRAVENOUS AS NEEDED
Status: DISCONTINUED | OUTPATIENT
Start: 2024-05-22 | End: 2024-05-22 | Stop reason: HOSPADM

## 2024-05-22 RX ORDER — MORPHINE SULFATE 2 MG/ML
2 INJECTION, SOLUTION INTRAMUSCULAR; INTRAVENOUS
Status: DISCONTINUED | OUTPATIENT
Start: 2024-05-22 | End: 2024-05-22 | Stop reason: HOSPADM

## 2024-05-22 RX ORDER — HEPARIN SODIUM 1000 [USP'U]/ML
INJECTION, SOLUTION INTRAVENOUS; SUBCUTANEOUS AS NEEDED
Status: DISCONTINUED | OUTPATIENT
Start: 2024-05-22 | End: 2024-05-22 | Stop reason: HOSPADM

## 2024-05-22 RX ADMIN — MORPHINE SULFATE 2 MG: 2 INJECTION, SOLUTION INTRAMUSCULAR; INTRAVENOUS at 13:15

## 2024-05-22 RX ADMIN — SODIUM CHLORIDE 100 ML/HR: 9 INJECTION, SOLUTION INTRAVENOUS at 07:09

## 2024-05-22 RX ADMIN — ALUMINUM HYDROXIDE, MAGNESIUM HYDROXIDE, AND SIMETHICONE 30 ML: 200; 200; 20 SUSPENSION ORAL at 11:52

## 2024-05-22 ASSESSMENT — PAIN - FUNCTIONAL ASSESSMENT: PAIN_FUNCTIONAL_ASSESSMENT: 0-10

## 2024-05-22 ASSESSMENT — COLUMBIA-SUICIDE SEVERITY RATING SCALE - C-SSRS
6. HAVE YOU EVER DONE ANYTHING, STARTED TO DO ANYTHING, OR PREPARED TO DO ANYTHING TO END YOUR LIFE?: NO
2. HAVE YOU ACTUALLY HAD ANY THOUGHTS OF KILLING YOURSELF?: NO
1. IN THE PAST MONTH, HAVE YOU WISHED YOU WERE DEAD OR WISHED YOU COULD GO TO SLEEP AND NOT WAKE UP?: NO

## 2024-05-22 ASSESSMENT — PAIN DESCRIPTION - LOCATION: LOCATION: CHEST

## 2024-05-22 ASSESSMENT — PAIN SCALES - GENERAL: PAINLEVEL_OUTOF10: 7

## 2024-05-22 NOTE — PROGRESS NOTES
Patient is stable status post LHC/PCI under the care of Dr. Trimble.  Discussed results of procedure with patient.  Pictures provided.  Findings of the LHC revealed patent previous stents in the RCA, a new 90% stenosis in the mid RCA, mild disease in the circumflex and LAD and an LVEF of 35% which is unchanged from previous assessment.  Patient underwent successful PCI with 1 HOME placed to the RCA reducing that lesion down to 0% stenosis.  The patient tolerated the procedure well.  Please see procedural report for complete details.  Patient was initiated on DAPT with aspirin 81 mg daily and Plavix 75 mg daily.  He will be discharged home later today barring no complications.  He will be scheduled to follow-up with Dr. Trimble in 1 to 2 weeks.  All questions answered.  Patient verbalized understanding.

## 2024-05-22 NOTE — SIGNIFICANT EVENT
Right hand remains reddish/purple. Removed another 2 ml of air from vascband with no hematoma or oozing. Pt states that pain is decreasing and now he feels mild pain in jaw. Pt denies needs at this time.

## 2024-05-22 NOTE — POST-PROCEDURE NOTE
Physician Transition of Care Summary  Invasive Cardiovascular Lab    Procedure Date: 5/22/2024  Attending:    * Esthela Trimble - Primary  Resident/Fellow/Other Assistant: Surgeons and Role:  * No surgeons found with a matching role *    Indications:   Pre-op Diagnosis     * Angina, class III (CMS-HCC) [I20.9]     * Arteriosclerosis of coronary artery [I25.10]    Post-procedure diagnosis:   Post-op Diagnosis     * Angina, class III (CMS-HCC) [I20.9]     * Arteriosclerosis of coronary artery [I25.10]    Procedure(s):     * Left Heart Cath, With LV    * PCI ADRIAN Stent- Coronary      Procedure Findings:   90% stenosis of mid right coronary artery, patent previous stents, ejection fraction of 35%, mild disease of LAD and circumflex, successful PTCA drug-eluting stent to right coronary artery    Description of the Procedure:   Left heart catheterization coronary angiography left ventriculogram, PTCA with drug-eluting stent of right coronary artery    Complications:   None    Stents/Implants:   Implants       Stent    Stent, [place] Moffat Adrian, 3.00 X 22rx - [id number] - Implanted        Inventory item: STENT, SAMY FRONTIER ADRIAN, 3.00 X 22RX Model/Cat number: [id number]    : [employer] Lot number: [id number]    Device identifier: [id number]        As of 5/22/2024       Status: Implanted                              Anticoagulation/Antiplatelet Plan:   Intravenous heparin, Plavix load    Estimated Blood Loss:   0 mL    Anesthesia: Moderate Sedation Anesthesia Staff: No anesthesia staff entered.    Any Specimen(s) Removed:   No specimens collected during this procedure.    Disposition:   Dual antiplatelet therapy      Electronically signed by: Esthela Trimble MD, 5/22/2024 11:26 AM

## 2024-05-22 NOTE — SIGNIFICANT EVENT
While rounding Pt now complaining of jaw pain rated 7/10. Notified Saima Santiago CNP and she ordered another EKG. EKG performed ans taken down to Saima which was then viewed by Dr. Trimble, physician is unconcerned. Morphine administered per physician order. Right radial site remains soft and stable with no hematoma or oozing. Pt given turkey sandwich box and water/coffee.

## 2024-05-22 NOTE — SIGNIFICANT EVENT
Upon arrival to room focused assessment performed and Pt is complaining of acid reflux/pain. Spoke with Saima Santiago CNP and she ordered maalox 400 mg four times daily prn. Placed this order into the computer and administered per order. Upon intial focused assessment right radial site is soft and stable but hand is reddish/purple distal to vascband. With cath lab RN in the room removed 6 ml of air from band. Site is soft and stable with no hematoma or oozing. Saima spoke with Pt regarding results of procedure and he states understanding. EKG paged and at bedside. Pt denies current needs at this time.

## 2024-05-22 NOTE — SIGNIFICANT EVENT
Pt ambulated down oswald to  and voided clear/yellow urine. Right radial site remains soft and stable with no hematoma or oozing. No complaints of dizziness/lightheadedness/pain. Pt denies further needs.

## 2024-05-22 NOTE — SIGNIFICANT EVENT
Vascband removed per physician order, gauze and DSD applied to site. Right radial soft and stable with no hematoma or oozing. Pt states that jaw pain is now at a 0. Ate 100% of turkey sandwich box and drinking water/coffee. Pt denies further needs at this time. Called Pt's wife and updated with discharge time.

## 2024-05-22 NOTE — SIGNIFICANT EVENT
"Discharge instructions given via \"teach back\" method. Covered: Arterial Puncture Wound Care Discharge Instructions/education, medications/when to resume them, plavix pamphlet and education, follow up appointments, stent card, and cardiac rehab referral. Pt states understanding and answers follow up questions correctly. Pt ambulating ad jakob. Right radial site soft and stable with no hematoma or oozing.   "

## 2024-05-22 NOTE — Clinical Note
Angioplasty of the middle right coronary artery lesion. Inflation 1: Pressure = 12 adán; Duration = 10 sec.

## 2024-05-23 LAB
ACT BLD: 294 SEC (ref 89–169)
ATRIAL RATE: 576 BPM
ATRIAL RATE: 60 BPM
P AXIS: 0 DEGREES
P AXIS: 6 DEGREES
P OFFSET: 187 MS
P OFFSET: 189 MS
P ONSET: 150 MS
P ONSET: 155 MS
PR INTERVAL: 214 MS
PR INTERVAL: 218 MS
Q ONSET: 212 MS
Q ONSET: 214 MS
QRS COUNT: 10 BEATS
QRS COUNT: 9 BEATS
QRS DURATION: 100 MS
QRS DURATION: 102 MS
QT INTERVAL: 404 MS
QT INTERVAL: 418 MS
QTC CALCULATION(BAZETT): 404 MS
QTC CALCULATION(BAZETT): 418 MS
QTC FREDERICIA: 404 MS
QTC FREDERICIA: 418 MS
R AXIS: -7 DEGREES
R AXIS: 9 DEGREES
T AXIS: 12 DEGREES
T AXIS: 9 DEGREES
T OFFSET: 416 MS
T OFFSET: 421 MS
VENTRICULAR RATE: 60 BPM
VENTRICULAR RATE: 60 BPM

## 2024-06-18 ENCOUNTER — HOSPITAL ENCOUNTER (OUTPATIENT)
Dept: CARDIOLOGY | Facility: HOSPITAL | Age: 54
Discharge: HOME | End: 2024-06-18
Payer: COMMERCIAL

## 2024-06-18 DIAGNOSIS — I47.29 PAROXYSMAL VENTRICULAR TACHYCARDIA (MULTI): ICD-10-CM

## 2024-06-18 DIAGNOSIS — Z95.810 PRESENCE OF AUTOMATIC CARDIOVERTER/DEFIBRILLATOR (AICD): ICD-10-CM

## 2024-06-24 ENCOUNTER — HOSPITAL ENCOUNTER (OUTPATIENT)
Dept: CARDIOLOGY | Facility: HOSPITAL | Age: 54
Discharge: HOME | End: 2024-06-24
Payer: COMMERCIAL

## 2024-06-24 DIAGNOSIS — Z95.810 PRESENCE OF AUTOMATIC CARDIOVERTER/DEFIBRILLATOR (AICD): ICD-10-CM

## 2024-06-24 DIAGNOSIS — I47.29 PAROXYSMAL VENTRICULAR TACHYCARDIA (MULTI): ICD-10-CM

## 2024-07-19 ENCOUNTER — APPOINTMENT (OUTPATIENT)
Dept: PRIMARY CARE | Facility: CLINIC | Age: 54
End: 2024-07-19
Payer: COMMERCIAL

## 2024-07-19 VITALS
SYSTOLIC BLOOD PRESSURE: 133 MMHG | BODY MASS INDEX: 31.78 KG/M2 | HEART RATE: 67 BPM | HEIGHT: 70 IN | DIASTOLIC BLOOD PRESSURE: 83 MMHG | WEIGHT: 222 LBS | TEMPERATURE: 96.9 F

## 2024-07-19 DIAGNOSIS — Z87.891 FORMER CIGARETTE SMOKER: ICD-10-CM

## 2024-07-19 DIAGNOSIS — I10 ESSENTIAL HYPERTENSION: ICD-10-CM

## 2024-07-19 DIAGNOSIS — J43.2 CENTRILOBULAR EMPHYSEMA (MULTI): ICD-10-CM

## 2024-07-19 DIAGNOSIS — E03.9 HYPOTHYROIDISM, UNSPECIFIED TYPE: ICD-10-CM

## 2024-07-19 DIAGNOSIS — M48.062 SPINAL STENOSIS OF LUMBAR REGION WITH NEUROGENIC CLAUDICATION: Primary | ICD-10-CM

## 2024-07-19 PROBLEM — I25.5 ISCHEMIC CONGESTIVE CARDIOMYOPATHY (MULTI): Status: ACTIVE | Noted: 2023-06-29

## 2024-07-19 PROBLEM — I25.5 ISCHEMIC CONGESTIVE CARDIOMYOPATHY (MULTI): Status: ACTIVE | Noted: 2023-01-16

## 2024-07-19 PROBLEM — I42.0 ISCHEMIC CONGESTIVE CARDIOMYOPATHY (MULTI): Status: ACTIVE | Noted: 2023-06-29

## 2024-07-19 PROBLEM — I42.0 ISCHEMIC CONGESTIVE CARDIOMYOPATHY (MULTI): Status: ACTIVE | Noted: 2023-01-16

## 2024-07-19 PROCEDURE — 3008F BODY MASS INDEX DOCD: CPT | Performed by: INTERNAL MEDICINE

## 2024-07-19 PROCEDURE — 1036F TOBACCO NON-USER: CPT | Performed by: INTERNAL MEDICINE

## 2024-07-19 PROCEDURE — 3075F SYST BP GE 130 - 139MM HG: CPT | Performed by: INTERNAL MEDICINE

## 2024-07-19 PROCEDURE — 3079F DIAST BP 80-89 MM HG: CPT | Performed by: INTERNAL MEDICINE

## 2024-07-19 PROCEDURE — 99213 OFFICE O/P EST LOW 20 MIN: CPT | Performed by: INTERNAL MEDICINE

## 2024-07-19 RX ORDER — NITROGLYCERIN 0.4 MG/1
0.4 TABLET SUBLINGUAL EVERY 5 MIN PRN
Qty: 90 TABLET | Refills: 0 | Status: SHIPPED | OUTPATIENT
Start: 2024-07-19

## 2024-07-19 RX ORDER — AZELASTINE 1 MG/ML
1 SPRAY, METERED NASAL 2 TIMES DAILY
Qty: 30 ML | Refills: 1 | Status: SHIPPED | OUTPATIENT
Start: 2024-07-19 | End: 2025-07-19

## 2024-07-19 RX ORDER — LEVOTHYROXINE SODIUM 200 UG/1
200 TABLET ORAL DAILY
Qty: 90 TABLET | Refills: 3 | Status: SHIPPED | OUTPATIENT
Start: 2024-07-19 | End: 2025-07-19

## 2024-07-19 ASSESSMENT — ENCOUNTER SYMPTOMS
GASTROINTESTINAL NEGATIVE: 1
RESPIRATORY NEGATIVE: 1
ABDOMINAL PAIN: 0
PARESTHESIAS: 1
EYES NEGATIVE: 1
BACK PAIN: 1
CONSTITUTIONAL NEGATIVE: 1
ARTHRALGIAS: 0
NUMBNESS: 1
CARDIOVASCULAR NEGATIVE: 1

## 2024-07-19 NOTE — PROGRESS NOTES
Subjective   Patient ID: Hira Mojica is a 54 y.o. male who presents for Follow-up (4 mo fu and muscle aches in legs and back x 3 weeks ).  Back Pain  This is a recurrent problem. The current episode started more than 1 month ago. The problem occurs constantly. The problem is unchanged. The pain is present in the gluteal, lumbar spine and sacro-iliac. The quality of the pain is described as aching. The pain radiates to the right thigh and left thigh. The pain is at a severity of 6/10. The pain is moderate. The pain is Worse during the day. The symptoms are aggravated by bending, lying down and position. Associated symptoms include numbness and paresthesias. Pertinent negatives include no abdominal pain. He has tried analgesics and home exercises for the symptoms. The treatment provided no relief.     Heart Problem  This is a chronic problem. The current episode started more than 1 year ago. The problem occurs rarely. The problem has been resolved. Associated symptoms include arthralgias. Pertinent negatives include no abdominal pain, anorexia, chest pain, diaphoresis, fatigue or weakness. The treatment provided significant relief. Had PCI  Congestive Heart Failure  Presents for follow-up visit. Pertinent negatives include no abdominal pain, chest pain, chest pressure, edema, fatigue or shortness of breath. The symptoms have been improving. Compliance with total regimen is %. Compliance problems include adherence to diet.  Has ICD  Thyroid Problem  Presents for follow-up visit. Symptoms include hoarse voice. Patient reports no cold intolerance, diaphoresis, fatigue or weight loss. The symptoms have been stable.   Hypertension  This is a chronic problem. The current episode started more than 1 year ago. The problem is unchanged. The problem is uncontrolled. Pertinent negatives include no anxiety, chest pain or shortness of breath. The current treatment provides mild improvement. Hypertensive end-organ  damage includes CAD/MI. There is no history of angina. Identifiable causes of hypertension include sleep apnea and a thyroid problem.   Past Medical History  Past Medical History:   Diagnosis Date    COPD (chronic obstructive pulmonary disease) (Multi)     Hyperlipidemia        Social History  Social History     Tobacco Use    Smoking status: Former     Current packs/day: 0.00     Average packs/day: 3.0 packs/day for 20.0 years (60.0 ttl pk-yrs)     Types: Cigarettes     Start date:      Quit date:      Years since quittin.5    Smokeless tobacco: Never   Vaping Use    Vaping status: Never Used   Substance Use Topics    Alcohol use: Not Currently    Drug use: Never       Family History     Family History   Problem Relation Name Age of Onset    Other (arteriosclerotic CVD) Mother Telma     Other (cardiac disorder) Mother Telma     Stroke Mother Telma     Cancer Mother Telma     Colon cancer Mother Telma         had colonectomy which seemed to stop the spread    Abnormal EKG Mother Telma     Angina Mother Telma     Arrhythmia Mother Telma     Atrial fibrillation Mother Telma     Diabetes type II Mother Telma     Heart attack Mother Telma     Heart failure Mother Telma     Hyperlipidemia Mother Telma     Hypertension Mother Telma     Thyroid disease Mother Telma     Other (arteriosclerotic CVD) Father DARCI     Stroke Father DARCI     Cancer Father DARCI     Asthma Father DARCI     Hypertension Father DARCI     Lung disease Father DARCI     Other (arteriosclerotic CVD) Brother russ     Heart attack Brother russ     Heart disease Brother russ     Obesity Brother russ     Thyroid disease Mother's Sister reyes     Other (cardiac disorder) Other grandmother     Colon cancer Other grandmother         was too late for any remedies and passed away within a month of dx    Cancer Other aunt        Allergies:  Allergies   Allergen Reactions    Penicillins Hives    Ketorolac Other     Adverse reaction        Outpatient Medications:  Current  Outpatient Medications   Medication Instructions    albuterol 90 mcg/actuation inhaler 1-2 puffs, inhalation, 4 times daily    aspirin 81 mg EC tablet 1 tablet, oral, Daily    atorvastatin (LIPITOR) 80 mg, oral, Nightly    azelastine (Astelin) 137 mcg (0.1 %) nasal spray 1 spray, Each Nostril, 2 times daily, Use in each nostril as directed    budesonide-formoteroL (Symbicort) 160-4.5 mcg/actuation inhaler 2 puffs, inhalation, 2 times daily, RINSE MOUTH AFTER USE.    carvedilol (COREG) 25 mg, oral, 2 times daily    clopidogrel (PLAVIX) 75 mg, oral, Daily    hydrALAZINE (APRESOLINE) 50 mg, oral, 2 times daily    isosorbide mononitrate ER (Imdur) 30 mg 24 hr tablet 1 tablet, oral, Daily, In morning    levothyroxine (SYNTHROID, LEVOXYL) 200 mcg, oral, Daily    magnesium oxide 400 mg, oral, Daily    nitroglycerin (NITROSTAT) 0.4 mg, sublingual, Every 5 min PRN, PLACE 1 TABLET UNDER THE TONGUE EVERY 5 MINUTES FOR UP TO 3 DOSES AS NEEDED FOR CHEST PAIN.CALL 911 IF PAIN PERSISTS.    oxygen (O2) gas therapy 3 L NC at HS    torsemide (DEMADEX) 20 mg, oral, Daily    valsartan (DIOVAN) 320 mg, oral, Daily        Review of Systems   Constitutional: Negative.    Eyes: Negative.    Respiratory: Negative.     Cardiovascular: Negative.    Gastrointestinal: Negative.  Negative for abdominal pain.   Musculoskeletal:  Positive for back pain. Negative for arthralgias.   Neurological:  Positive for numbness and paresthesias.         Objective       Physical Exam  Vitals reviewed.   Constitutional:       Appearance: Normal appearance. He is normal weight.   HENT:      Head: Normocephalic and atraumatic.   Eyes:      Conjunctiva/sclera: Conjunctivae normal.   Cardiovascular:      Rate and Rhythm: Normal rate and regular rhythm.      Pulses: Normal pulses.   Pulmonary:      Effort: Pulmonary effort is normal.      Breath sounds: Normal breath sounds.   Abdominal:      Palpations: Abdomen is soft.   Musculoskeletal:         General: Normal  "range of motion.      Cervical back: Normal range of motion.      Comments: SLR limited rt side   Skin:     General: Skin is warm and dry.   Neurological:      General: No focal deficit present.   Psychiatric:         Mood and Affect: Mood normal.     /83 (BP Location: Right arm, Patient Position: Sitting, BP Cuff Size: Adult)   Pulse 67   Temp 36.1 °C (96.9 °F) (Temporal)   Ht 1.778 m (5' 10\")   Wt 101 kg (222 lb)   BMI 31.85 kg/m²      Assessment/Plan   Problem List Items Addressed This Visit       COPD (chronic obstructive pulmonary disease) (Multi)    Relevant Medications    azelastine (Astelin) 137 mcg (0.1 %) nasal spray    Essential hypertension    Relevant Medications    nitroglycerin (Nitrostat) 0.4 mg SL tablet    Hypothyroidism    Relevant Medications    levothyroxine (Synthroid, Levoxyl) 200 mcg tablet    Former cigarette smoker    Relevant Orders    CT lung screening low dose     Other Visit Diagnoses       Spinal stenosis of lumbar region with neurogenic claudication    -  Primary    Relevant Orders    XR lumbar spine 2-3 views    MR lumbar spine wo IV contrast        I discussed smoking history/status that determined patient meets criteria for lung cancer screening with low dose CT scan.By using shared decision making we determined that patient will benefit from screening, including discussion of benefits and harms of screening, follow up testing, overdiagnosis, false positive rate and total radiation exposure.I counseled the patient on the importance of adhering to annual lung cancer low dose screening, the impact of comorbidities and individual ability or willingness to undergo diagnosis and treatment if abnormalities found. I provided patient an order for low dose CT Lung screening. Pt was encouraged to stay adherent with no smoking status and refrain from any possibility of staring it again.  He continues to smoke, he remains euthyroid clinically, headaches are better, MRI was reviewed, " he has a moderate emphysema, in between he has a 1 PCI, he is on clopidogrel.  Now is complaining of intractable pain starting from the sacroiliac region going down into the knee bilateral right more than left with positive straight leg raising on the right side and also slightly impaired motor function on the right lower extremity.  Considering this finding patient could be benefited by MRI of lumbosacral spine as my diagnosis is lumbosacral spine stenosis.  Plain radiograph will be done followed by MRI as he has having this pain for long duration of time multiple trials of physical therapy and NSAID use as has been given.  It has been affecting his quality of life.  He will require low-dose CT scan of the lung being a former smoker.  BP readings could be better, lifestyle could be better.  He will be on clopidogrel therapy at least for now.  Depends upon his MRI report he will be guided further.  Vasculature seems to be intact in lower extremities.  Avoid excessive NSAIDs, follow-up in 4 months.

## 2024-07-23 ENCOUNTER — APPOINTMENT (OUTPATIENT)
Dept: CARDIOLOGY | Facility: CLINIC | Age: 54
End: 2024-07-23
Payer: COMMERCIAL

## 2024-07-23 VITALS
BODY MASS INDEX: 31.58 KG/M2 | HEART RATE: 60 BPM | DIASTOLIC BLOOD PRESSURE: 92 MMHG | WEIGHT: 220.1 LBS | SYSTOLIC BLOOD PRESSURE: 136 MMHG

## 2024-07-23 DIAGNOSIS — I25.5 ISCHEMIC CONGESTIVE CARDIOMYOPATHY (MULTI): ICD-10-CM

## 2024-07-23 DIAGNOSIS — I71.21 ANEURYSM OF ASCENDING AORTA WITHOUT RUPTURE (CMS-HCC): Primary | ICD-10-CM

## 2024-07-23 DIAGNOSIS — I42.0 ISCHEMIC CONGESTIVE CARDIOMYOPATHY (MULTI): ICD-10-CM

## 2024-07-23 DIAGNOSIS — Z98.61 CAD S/P PERCUTANEOUS CORONARY ANGIOPLASTY: ICD-10-CM

## 2024-07-23 DIAGNOSIS — Z95.810 CARDIAC DEFIBRILLATOR IN PLACE: ICD-10-CM

## 2024-07-23 DIAGNOSIS — G47.33 OBSTRUCTIVE SLEEP APNEA, ADULT: ICD-10-CM

## 2024-07-23 DIAGNOSIS — J43.2 CENTRILOBULAR EMPHYSEMA (MULTI): ICD-10-CM

## 2024-07-23 DIAGNOSIS — Z87.891 FORMER CIGARETTE SMOKER: ICD-10-CM

## 2024-07-23 DIAGNOSIS — I25.10 CAD S/P PERCUTANEOUS CORONARY ANGIOPLASTY: ICD-10-CM

## 2024-07-23 DIAGNOSIS — I10 ESSENTIAL HYPERTENSION: ICD-10-CM

## 2024-07-23 DIAGNOSIS — I47.29 PAROXYSMAL VENTRICULAR TACHYCARDIA (MULTI): ICD-10-CM

## 2024-07-23 DIAGNOSIS — E78.2 MIXED HYPERLIPIDEMIA: ICD-10-CM

## 2024-07-23 PROCEDURE — 3075F SYST BP GE 130 - 139MM HG: CPT | Performed by: INTERNAL MEDICINE

## 2024-07-23 PROCEDURE — 1036F TOBACCO NON-USER: CPT | Performed by: INTERNAL MEDICINE

## 2024-07-23 PROCEDURE — 99213 OFFICE O/P EST LOW 20 MIN: CPT | Performed by: INTERNAL MEDICINE

## 2024-07-23 PROCEDURE — 3080F DIAST BP >= 90 MM HG: CPT | Performed by: INTERNAL MEDICINE

## 2024-07-23 NOTE — PROGRESS NOTES
Referred by Dr. Bauman ref. provider found provider found for   Chief Complaint   Patient presents with    Follow-up     2 month follow-up s/p catheterization.  He c/o SOB, he states he getting an occasional twinge in the heart area        History of Present Illness  Hira Mojica is a 54 y.o. year old male patient status post PTCA with drug-eluting stent of the right coronary artery.  Doing well from a cardiac standpoint no complaint no symptoms of chest pain or shortness of breath however he is known to have ascending aortic aneurysm measured 4.3 cm.  Told the patient we will repeat CT angiography to evaluate his aneurysm.  Based on that further recommendation will be made    Past Medical History  Past Medical History:   Diagnosis Date    Abnormal ECG     Aneurysm (CMS-HCC)     Arrhythmia     Asthma ([facility])     Atrial fibrillation ([facility])     CHF (congestive heart failure) (Multi)     COPD (chronic obstructive pulmonary disease) (Multi)     Coronary artery disease     Hyperlipidemia     Hypertension     Myocardial infarction (Multi)     Sleep apnea        Social History  Social History     Tobacco Use    Smoking status: Former     Current packs/day: 0.00     Average packs/day: 3.0 packs/day for 20.0 years (60.0 ttl pk-yrs)     Types: Cigarettes     Start date:      Quit date:      Years since quittin.5    Smokeless tobacco: Never   Vaping Use    Vaping status: Never Used   Substance Use Topics    Alcohol use: Not Currently    Drug use: Never       Family History     Family History   Problem Relation Name Age of Onset    Other (arteriosclerotic CVD) Mother Telma     Other (cardiac disorder) Mother Telma     Stroke Mother Telma     Cancer Mother Telma     Colon cancer Mother Telma         had colonectomy which seemed to stop the spread    Abnormal EKG Mother Telma     Angina Mother Telma     Arrhythmia Mother Telma     Atrial fibrillation Mother Telma     Diabetes type II Mother Telma     Heart attack Mother  Telma     Heart failure Mother Telma     Hyperlipidemia Mother Telma     Hypertension Mother Telma     Thyroid disease Mother Telma     Other (arteriosclerotic CVD) Father DARCI     Stroke Father DARCI     Cancer Father DARCI     Asthma Father DARCI     Hypertension Father DARCI     Lung disease Father DARCI     Other (arteriosclerotic CVD) Brother russ     Heart attack Brother russ     Heart disease Brother russ     Obesity Brother russ     Diabetes Brother Yaron     Thyroid disease Mother's Sister reyes     Other (cardiac disorder) Other grandmother     Colon cancer Other grandmother         was too late for any remedies and passed away within a month of dx    Cancer Other aunt        Review of Systems  As per HPI, all other systems reviewed and negative.    Allergies:  Allergies   Allergen Reactions    Penicillins Hives    Ketorolac Other     Adverse reaction        Outpatient Medications:  Current Outpatient Medications   Medication Instructions    albuterol 90 mcg/actuation inhaler 1-2 puffs, inhalation, 4 times daily    aspirin 81 mg EC tablet 1 tablet, oral, Daily    atorvastatin (LIPITOR) 80 mg, oral, Nightly    azelastine (Astelin) 137 mcg (0.1 %) nasal spray 1 spray, Each Nostril, 2 times daily, Use in each nostril as directed    budesonide-formoteroL (Symbicort) 160-4.5 mcg/actuation inhaler 2 puffs, inhalation, 2 times daily, RINSE MOUTH AFTER USE.    carvedilol (COREG) 25 mg, oral, 2 times daily    clopidogrel (PLAVIX) 75 mg, oral, Daily    hydrALAZINE (APRESOLINE) 50 mg, oral, 2 times daily    isosorbide mononitrate ER (Imdur) 30 mg 24 hr tablet 1 tablet, oral, Daily, In morning    levothyroxine (SYNTHROID, LEVOXYL) 200 mcg, oral, Daily    magnesium oxide 400 mg, oral, Daily    nitroglycerin (NITROSTAT) 0.4 mg, sublingual, Every 5 min PRN, PLACE 1 TABLET UNDER THE TONGUE EVERY 5 MINUTES FOR UP TO 3 DOSES AS NEEDED FOR CHEST PAIN.CALL 911 IF PAIN PERSISTS.    torsemide (DEMADEX) 20 mg, oral, Daily    valsartan  (DIOVAN) 320 mg, oral, Daily         Vitals:  Vitals:    07/23/24 1109   BP: (!) 136/92   Pulse: 60       Physical Exam:  Physical Exam  Constitutional:       Appearance: Normal appearance.   HENT:      Head: Normocephalic.   Eyes:      Pupils: Pupils are equal, round, and reactive to light.   Cardiovascular:      Rate and Rhythm: Normal rate and regular rhythm.      Pulses: Normal pulses.      Heart sounds: Normal heart sounds.   Pulmonary:      Effort: Pulmonary effort is normal.      Breath sounds: Normal breath sounds.   Musculoskeletal:         General: Normal range of motion.   Skin:     General: Skin is warm and dry.   Neurological:      General: No focal deficit present.      Mental Status: He is alert and oriented to person, place, and time.             Assessment/Plan   Diagnoses and all orders for this visit:  CAD S/P percutaneous coronary angioplasty  Essential hypertension  Mixed hyperlipidemia  Cardiac defibrillator in place  Ischemic congestive cardiomyopathy ([facility])  Aneurysm of ascending aorta without rupture (CMS-HCC)  Paroxysmal ventricular tachycardia (Multi)  Centrilobular emphysema (Multi)  Obstructive sleep apnea, adult  BMI 31.0-31.9,adult  Former cigarette smoker          Esthela Trimble MD [facility]  Interventional Cardiology   of [facility]     Thank you for allowing me to participate in the care of this patient. Please do not hesitate to contact me with any further questions or concerns.

## 2024-07-23 NOTE — PATIENT INSTRUCTIONS
Continue same medications and treatments.   Patient educated on proper medication use.   Patient educated on risk factor modification.   Please bring any lab results from other providers / physicians to your next appointment.     Please bring all medicines, vitamins, and herbal supplements with you when you come to the office.     Prescriptions will not be filled unless you are compliant with your follow up appointments or have a follow up appointment scheduled as per instruction of your physician. Refills should be requested at the time of your visit.    CTA CHEST SOON, CALL RESULTS    FOLLOW UP IN 6 MONTHS    Sneha JIMÉNEZ LPN, am scribing for and in the presence of Dr. Esthela Trimble MD, FACC

## 2024-07-24 ENCOUNTER — HOSPITAL ENCOUNTER (OUTPATIENT)
Dept: CARDIOLOGY | Facility: HOSPITAL | Age: 54
Discharge: HOME | End: 2024-07-24
Payer: COMMERCIAL

## 2024-07-24 DIAGNOSIS — I47.29 PAROXYSMAL VENTRICULAR TACHYCARDIA (MULTI): ICD-10-CM

## 2024-07-24 DIAGNOSIS — Z95.810 PRESENCE OF AUTOMATIC CARDIOVERTER/DEFIBRILLATOR (AICD): ICD-10-CM

## 2024-07-26 ENCOUNTER — HOSPITAL ENCOUNTER (OUTPATIENT)
Dept: RADIOLOGY | Facility: HOSPITAL | Age: 54
Discharge: HOME | End: 2024-07-26
Payer: COMMERCIAL

## 2024-07-26 ENCOUNTER — LAB (OUTPATIENT)
Dept: LAB | Facility: LAB | Age: 54
End: 2024-07-26
Payer: COMMERCIAL

## 2024-07-26 DIAGNOSIS — I71.21 ANEURYSM OF ASCENDING AORTA WITHOUT RUPTURE (CMS-HCC): ICD-10-CM

## 2024-07-26 DIAGNOSIS — M48.062 SPINAL STENOSIS OF LUMBAR REGION WITH NEUROGENIC CLAUDICATION: ICD-10-CM

## 2024-07-26 LAB
BUN SERPL-MCNC: 19 MG/DL (ref 6–23)
CREAT SERPL-MCNC: 0.98 MG/DL (ref 0.5–1.3)
EGFRCR SERPLBLD CKD-EPI 2021: >90 ML/MIN/1.73M*2

## 2024-07-26 PROCEDURE — 72110 X-RAY EXAM L-2 SPINE 4/>VWS: CPT | Performed by: RADIOLOGY

## 2024-07-26 PROCEDURE — 82565 ASSAY OF CREATININE: CPT

## 2024-07-26 PROCEDURE — 84520 ASSAY OF UREA NITROGEN: CPT

## 2024-07-26 PROCEDURE — 36415 COLL VENOUS BLD VENIPUNCTURE: CPT

## 2024-07-26 PROCEDURE — 72110 X-RAY EXAM L-2 SPINE 4/>VWS: CPT

## 2024-08-02 ENCOUNTER — HOSPITAL ENCOUNTER (OUTPATIENT)
Dept: CARDIOLOGY | Facility: HOSPITAL | Age: 54
Discharge: HOME | End: 2024-08-02
Payer: COMMERCIAL

## 2024-08-02 DIAGNOSIS — I47.29 PAROXYSMAL VENTRICULAR TACHYCARDIA (MULTI): ICD-10-CM

## 2024-08-02 DIAGNOSIS — Z95.810 PRESENCE OF AUTOMATIC CARDIOVERTER/DEFIBRILLATOR (AICD): ICD-10-CM

## 2024-08-09 ENCOUNTER — HOSPITAL ENCOUNTER (OUTPATIENT)
Dept: RADIOLOGY | Facility: HOSPITAL | Age: 54
Discharge: HOME | End: 2024-08-09
Payer: COMMERCIAL

## 2024-08-09 DIAGNOSIS — I71.21 ANEURYSM OF ASCENDING AORTA WITHOUT RUPTURE (CMS-HCC): ICD-10-CM

## 2024-08-09 PROCEDURE — 2550000001 HC RX 255 CONTRASTS: Performed by: INTERNAL MEDICINE

## 2024-08-09 PROCEDURE — 71275 CT ANGIOGRAPHY CHEST: CPT

## 2024-08-14 ENCOUNTER — HOSPITAL ENCOUNTER (OUTPATIENT)
Dept: CARDIOLOGY | Facility: HOSPITAL | Age: 54
Discharge: HOME | End: 2024-08-14
Payer: COMMERCIAL

## 2024-08-14 DIAGNOSIS — Z95.810 PRESENCE OF AUTOMATIC CARDIOVERTER/DEFIBRILLATOR (AICD): ICD-10-CM

## 2024-08-14 DIAGNOSIS — I47.29 PAROXYSMAL VENTRICULAR TACHYCARDIA (MULTI): ICD-10-CM

## 2024-08-15 ENCOUNTER — APPOINTMENT (OUTPATIENT)
Dept: RADIOLOGY | Facility: HOSPITAL | Age: 54
End: 2024-08-15
Payer: COMMERCIAL

## 2024-08-15 ENCOUNTER — TELEPHONE (OUTPATIENT)
Dept: CARDIOLOGY | Facility: CLINIC | Age: 54
End: 2024-08-15
Payer: COMMERCIAL

## 2024-08-15 ENCOUNTER — APPOINTMENT (OUTPATIENT)
Dept: CARDIOLOGY | Facility: HOSPITAL | Age: 54
End: 2024-08-15
Payer: COMMERCIAL

## 2024-08-15 ENCOUNTER — HOSPITAL ENCOUNTER (OUTPATIENT)
Dept: CARDIOLOGY | Facility: HOSPITAL | Age: 54
Discharge: HOME | End: 2024-08-15
Payer: COMMERCIAL

## 2024-08-15 DIAGNOSIS — I47.29 PAROXYSMAL VENTRICULAR TACHYCARDIA (MULTI): ICD-10-CM

## 2024-08-15 DIAGNOSIS — Z95.810 PRESENCE OF AUTOMATIC CARDIOVERTER/DEFIBRILLATOR (AICD): ICD-10-CM

## 2024-08-15 NOTE — TELEPHONE ENCOUNTER
----- Message from Esthela Trimble sent at 8/9/2024 12:21 PM EDT -----  Ascending aortic aneurysm 4.1 cm no change compared to previous study repeat in 2 years  ----- Message -----  From: Tiffanie, Radiology Results In  Sent: 8/9/2024  11:45 AM EDT  To: Esthela Trimble MD

## 2024-08-15 NOTE — TELEPHONE ENCOUNTER
Call placed to patient and advised that aneurysm is unchanged.  Plan is to repeat test in 2 years.  Patient verbalized understanding.

## 2024-08-31 ENCOUNTER — APPOINTMENT (OUTPATIENT)
Dept: RADIOLOGY | Facility: CLINIC | Age: 54
End: 2024-08-31
Payer: COMMERCIAL

## 2024-09-06 ENCOUNTER — HOSPITAL ENCOUNTER (OUTPATIENT)
Dept: CARDIOLOGY | Facility: HOSPITAL | Age: 54
Discharge: HOME | End: 2024-09-06
Payer: COMMERCIAL

## 2024-09-06 DIAGNOSIS — I47.29 PAROXYSMAL VENTRICULAR TACHYCARDIA (MULTI): ICD-10-CM

## 2024-09-06 DIAGNOSIS — Z95.810 PRESENCE OF AUTOMATIC CARDIOVERTER/DEFIBRILLATOR (AICD): ICD-10-CM

## 2024-09-06 PROCEDURE — 93296 REM INTERROG EVL PM/IDS: CPT

## 2024-10-02 ENCOUNTER — HOSPITAL ENCOUNTER (OUTPATIENT)
Dept: CARDIOLOGY | Facility: HOSPITAL | Age: 54
Discharge: HOME | End: 2024-10-02
Payer: COMMERCIAL

## 2024-10-02 DIAGNOSIS — Z95.810 PRESENCE OF AUTOMATIC CARDIOVERTER/DEFIBRILLATOR (AICD): ICD-10-CM

## 2024-10-02 DIAGNOSIS — I47.29 PAROXYSMAL VENTRICULAR TACHYCARDIA (MULTI): ICD-10-CM

## 2024-10-03 ENCOUNTER — HOSPITAL ENCOUNTER (OUTPATIENT)
Dept: CARDIOLOGY | Facility: HOSPITAL | Age: 54
Discharge: HOME | End: 2024-10-03
Payer: COMMERCIAL

## 2024-10-03 DIAGNOSIS — Z95.810 PRESENCE OF AUTOMATIC CARDIOVERTER/DEFIBRILLATOR (AICD): ICD-10-CM

## 2024-10-03 DIAGNOSIS — I47.29 PAROXYSMAL VENTRICULAR TACHYCARDIA (MULTI): ICD-10-CM

## 2024-10-18 ENCOUNTER — LAB (OUTPATIENT)
Dept: LAB | Facility: LAB | Age: 54
End: 2024-10-18
Payer: COMMERCIAL

## 2024-10-18 ENCOUNTER — APPOINTMENT (OUTPATIENT)
Dept: PRIMARY CARE | Facility: CLINIC | Age: 54
End: 2024-10-18
Payer: COMMERCIAL

## 2024-10-18 VITALS
SYSTOLIC BLOOD PRESSURE: 140 MMHG | BODY MASS INDEX: 32.07 KG/M2 | TEMPERATURE: 95.7 F | WEIGHT: 224 LBS | HEIGHT: 70 IN | HEART RATE: 67 BPM | DIASTOLIC BLOOD PRESSURE: 90 MMHG

## 2024-10-18 DIAGNOSIS — J01.00 ACUTE NON-RECURRENT MAXILLARY SINUSITIS: ICD-10-CM

## 2024-10-18 DIAGNOSIS — I25.10 CAD S/P PERCUTANEOUS CORONARY ANGIOPLASTY: ICD-10-CM

## 2024-10-18 DIAGNOSIS — I71.21 ANEURYSM OF ASCENDING AORTA WITHOUT RUPTURE (CMS-HCC): Primary | ICD-10-CM

## 2024-10-18 DIAGNOSIS — I42.8 OTHER CARDIOMYOPATHY: ICD-10-CM

## 2024-10-18 DIAGNOSIS — M51.360 DEGENERATION OF INTERVERTEBRAL DISC OF LUMBAR REGION WITH DISCOGENIC BACK PAIN: ICD-10-CM

## 2024-10-18 DIAGNOSIS — Z98.61 CAD S/P PERCUTANEOUS CORONARY ANGIOPLASTY: ICD-10-CM

## 2024-10-18 DIAGNOSIS — I10 ESSENTIAL HYPERTENSION: ICD-10-CM

## 2024-10-18 DIAGNOSIS — Z12.5 PROSTATE CANCER SCREENING: ICD-10-CM

## 2024-10-18 DIAGNOSIS — E03.9 ACQUIRED HYPOTHYROIDISM: ICD-10-CM

## 2024-10-18 LAB
CHOLEST SERPL-MCNC: 244 MG/DL (ref 0–199)
CHOLESTEROL/HDL RATIO: 4.7
HDLC SERPL-MCNC: 51.4 MG/DL
LDLC SERPL CALC-MCNC: 181 MG/DL
NON HDL CHOLESTEROL: 193 MG/DL (ref 0–149)
PSA SERPL-MCNC: 0.66 NG/ML
T4 FREE SERPL-MCNC: 0.85 NG/DL (ref 0.61–1.12)
TRIGL SERPL-MCNC: 58 MG/DL (ref 0–149)
TSH SERPL-ACNC: 19.44 MIU/L (ref 0.44–3.98)
VLDL: 12 MG/DL (ref 0–40)

## 2024-10-18 PROCEDURE — 80061 LIPID PANEL: CPT

## 2024-10-18 PROCEDURE — 84439 ASSAY OF FREE THYROXINE: CPT

## 2024-10-18 PROCEDURE — 3077F SYST BP >= 140 MM HG: CPT | Performed by: INTERNAL MEDICINE

## 2024-10-18 PROCEDURE — 3080F DIAST BP >= 90 MM HG: CPT | Performed by: INTERNAL MEDICINE

## 2024-10-18 PROCEDURE — 84443 ASSAY THYROID STIM HORMONE: CPT

## 2024-10-18 PROCEDURE — 3008F BODY MASS INDEX DOCD: CPT | Performed by: INTERNAL MEDICINE

## 2024-10-18 PROCEDURE — 1036F TOBACCO NON-USER: CPT | Performed by: INTERNAL MEDICINE

## 2024-10-18 PROCEDURE — 36415 COLL VENOUS BLD VENIPUNCTURE: CPT

## 2024-10-18 PROCEDURE — 84153 ASSAY OF PSA TOTAL: CPT

## 2024-10-18 PROCEDURE — 99214 OFFICE O/P EST MOD 30 MIN: CPT | Performed by: INTERNAL MEDICINE

## 2024-10-18 RX ORDER — CARVEDILOL 25 MG/1
25 TABLET ORAL 2 TIMES DAILY
Qty: 90 TABLET | Refills: 3 | Status: SHIPPED | OUTPATIENT
Start: 2024-10-18

## 2024-10-18 RX ORDER — VALSARTAN 160 MG/1
160 TABLET ORAL DAILY
Qty: 180 TABLET | Refills: 3 | Status: SHIPPED | OUTPATIENT
Start: 2024-10-18

## 2024-10-18 RX ORDER — AZITHROMYCIN 250 MG/1
TABLET, FILM COATED ORAL
Qty: 6 TABLET | Refills: 0 | Status: SHIPPED | OUTPATIENT
Start: 2024-10-18 | End: 2024-10-23

## 2024-10-18 RX ORDER — VALSARTAN 160 MG/1
320 TABLET ORAL DAILY
Qty: 180 TABLET | Refills: 3 | Status: SHIPPED | OUTPATIENT
Start: 2024-10-18 | End: 2024-10-18

## 2024-10-18 ASSESSMENT — COLUMBIA-SUICIDE SEVERITY RATING SCALE - C-SSRS
1. IN THE PAST MONTH, HAVE YOU WISHED YOU WERE DEAD OR WISHED YOU COULD GO TO SLEEP AND NOT WAKE UP?: NO
6. HAVE YOU EVER DONE ANYTHING, STARTED TO DO ANYTHING, OR PREPARED TO DO ANYTHING TO END YOUR LIFE?: NO
2. HAVE YOU ACTUALLY HAD ANY THOUGHTS OF KILLING YOURSELF?: NO

## 2024-10-18 ASSESSMENT — PATIENT HEALTH QUESTIONNAIRE - PHQ9
2. FEELING DOWN, DEPRESSED OR HOPELESS: NOT AT ALL
1. LITTLE INTEREST OR PLEASURE IN DOING THINGS: NOT AT ALL
SUM OF ALL RESPONSES TO PHQ9 QUESTIONS 1 AND 2: 0

## 2024-10-18 ASSESSMENT — ENCOUNTER SYMPTOMS
LOSS OF SENSATION IN FEET: 1
DEPRESSION: 0
SORE THROAT: 1
CHILLS: 0
COUGH: 1
CONSTITUTIONAL NEGATIVE: 1
DIZZINESS: 0
CARDIOVASCULAR NEGATIVE: 1
SINUS PRESSURE: 1
WOUND: 0
HEADACHES: 1
GASTROINTESTINAL NEGATIVE: 1
MUSCULOSKELETAL NEGATIVE: 1
OCCASIONAL FEELINGS OF UNSTEADINESS: 1

## 2024-10-18 NOTE — PROGRESS NOTES
Subjective   Patient ID: Hira Mojica is a 54 y.o. male who presents for Follow-up (3 mo fu and nasal drainage and cough x 4 day).  Sinusitis  This is a recurrent problem. The current episode started 1 to 4 weeks ago. The problem is unchanged. There has been no fever. The pain is mild. Associated symptoms include congestion, coughing, headaches, sinus pressure and a sore throat. Pertinent negatives include no chills. The treatment provided no relief.     Heart Problem  This is a chronic problem. The current episode started more than 1 year ago. The problem occurs rarely. The problem has been resolved. Associated symptoms include arthralgias. Pertinent negatives include no abdominal pain, anorexia, chest pain, diaphoresis, fatigue or weakness. The treatment provided significant relief. Had PCI  Congestive Heart Failure  Presents for follow-up visit. Pertinent negatives include no abdominal pain, chest pain, chest pressure, edema, fatigue or shortness of breath. The symptoms have been improving. Compliance with total regimen is %. Compliance problems include adherence to diet.  Has ICD  Hypertension  This is a chronic problem. The current episode started more than 1 year ago. The problem is unchanged. The problem is uncontrolled. Pertinent negatives include no anxiety, chest pain or shortness of breath. The current treatment provides mild improvement. Hypertensive end-organ damage includes CAD/MI. There is no history of angina. Identifiable causes of hypertension include sleep apnea and a thyroid problem.   Past Medical History  Past Medical History:   Diagnosis Date    Asthma     COPD (chronic obstructive pulmonary disease) ([facility])     Hyperlipidemia        Social History  Social History     Tobacco Use    Smoking status: Former     Current packs/day: 0.00     Average packs/day: 3.0 packs/day for 20.0 years (60.0 ttl pk-yrs)     Types: Cigarettes     Start date: 1993     Quit date: 2013     Years since  quittin.8    Smokeless tobacco: Never   Vaping Use    Vaping status: Never Used   Substance Use Topics    Alcohol use: Not Currently    Drug use: Never       Family History     Family History   Problem Relation Name Age of Onset    Other (arteriosclerotic CVD) Mother Telma     Other (cardiac disorder) Mother Telma     Stroke Mother Telma     Cancer Mother Telma     Colon cancer Mother Telma         had colonectomy which seemed to stop the spread    Abnormal EKG Mother Telma     Angina Mother Telma     Arrhythmia Mother Telma     Atrial fibrillation Mother Telma     Diabetes type II Mother Telma     Heart attack Mother Telma     Heart failure Mother Telma     Hyperlipidemia Mother Telma     Hypertension Mother Telma     Thyroid disease Mother Telma     Other (arteriosclerotic CVD) Father DARCI     Stroke Father DARCI     Cancer Father DARCI     Asthma Father DARCI     Hypertension Father DARCI     Lung disease Father DARCI     Other (arteriosclerotic CVD) Brother russ     Heart attack Brother russ     Heart disease Brother russ     Obesity Brother russ     Diabetes Brother Yaron     Thyroid disease Mother's Sister reyes     Other (cardiac disorder) Other grandmother     Colon cancer Other grandmother         was too late for any remedies and passed away within a month of dx    Cancer Other aunt        Allergies:  Allergies   Allergen Reactions    Penicillins Hives    Ketorolac Other     Adverse reaction        Outpatient Medications:  Current Outpatient Medications   Medication Instructions    albuterol 90 mcg/actuation inhaler 1-2 puffs, inhalation, 4 times daily    aspirin 81 mg EC tablet 1 tablet, oral, Daily    atorvastatin (LIPITOR) 80 mg, oral, Nightly    azelastine (Astelin) 137 mcg (0.1 %) nasal spray 1 spray, Each Nostril, 2 times daily, Use in each nostril as directed    budesonide-formoteroL (Symbicort) 160-4.5 mcg/actuation inhaler 2 puffs, inhalation, 2 times daily, RINSE MOUTH AFTER USE.    carvedilol (COREG) 25 mg,  "oral, 2 times daily    clopidogrel (PLAVIX) 75 mg, oral, Daily    hydrALAZINE (APRESOLINE) 50 mg, oral, 2 times daily    isosorbide mononitrate ER (Imdur) 30 mg 24 hr tablet 1 tablet, oral, Daily, In morning    levothyroxine (SYNTHROID, LEVOXYL) 200 mcg, oral, Daily    magnesium oxide 400 mg, oral, Daily    nitroglycerin (NITROSTAT) 0.4 mg, sublingual, Every 5 min PRN, PLACE 1 TABLET UNDER THE TONGUE EVERY 5 MINUTES FOR UP TO 3 DOSES AS NEEDED FOR CHEST PAIN.CALL 911 IF PAIN PERSISTS.    torsemide (DEMADEX) 20 mg, oral, Daily    valsartan (DIOVAN) 320 mg, oral, Daily        Review of Systems   Constitutional: Negative.  Negative for chills.   HENT:  Positive for congestion, sinus pressure and sore throat.    Respiratory:  Positive for cough.    Cardiovascular: Negative.    Gastrointestinal: Negative.    Musculoskeletal: Negative.    Skin:  Negative for wound.   Neurological:  Positive for headaches. Negative for dizziness.         Objective       Physical Exam  Vitals reviewed.   Constitutional:       Appearance: Normal appearance. He is obese.   HENT:      Head: Normocephalic.      Mouth/Throat:      Pharynx: No posterior oropharyngeal erythema.   Eyes:      Conjunctiva/sclera: Conjunctivae normal.   Cardiovascular:      Rate and Rhythm: Normal rate and regular rhythm.      Pulses: Normal pulses.   Pulmonary:      Effort: Pulmonary effort is normal.      Breath sounds: Normal breath sounds.   Abdominal:      Palpations: Abdomen is soft.   Musculoskeletal:         General: Tenderness present. No deformity.      Cervical back: Neck supple.   Skin:     General: Skin is warm and dry.   Neurological:      General: No focal deficit present.   Psychiatric:         Mood and Affect: Mood normal.     /90 (BP Location: Right arm, Patient Position: Sitting, BP Cuff Size: Adult)   Pulse 67   Temp 35.4 °C (95.7 °F) (Temporal)   Ht 1.778 m (5' 10\")   Wt 102 kg (224 lb)   BMI 32.14 kg/m²      Assessment/Plan   Problem " List Items Addressed This Visit       Essential hypertension    Relevant Medications    valsartan (Diovan) 160 mg tablet    CAD S/P percutaneous coronary angioplasty    Relevant Medications    carvedilol (Coreg) 25 mg tablet    Aneurysm of ascending aorta without rupture (CMS-[facility]) - Primary     Other Visit Diagnoses       Other cardiomyopathy        Relevant Medications    valsartan (Diovan) 160 mg tablet    carvedilol (Coreg) 25 mg tablet    Acute non-recurrent maxillary sinusitis            Came because he has been having symptoms of sinusitis, congestion, stuffiness, facial pressure, postnasal drip, first of all this problem will be dealt with antibacterial because purulence is there.  His BP readings are high, please monitor your BP at home, valsartan will be kept at 160 mg only.  He has impaired systolic functions, he has a PCI in month of May, no angina, this aneurysm on ascending aorta is under surveillance, he could not see spine surgeon, MRI was not done, plain x-ray was done report was reviewed.  He was referred to a local spine nurse practitioner.  He needs PSA.  Profile and TSH also.  Sometimes he struggles sometimes he feels better, compliance with the CPAP has been consistently maintained.  He continued to have a hyperhidrosis.  Hopefully he will feel better with the antibacterials adjustment of therapy could be required if BP readings remains high please stay in touch with us.  He will stay on clopidogrel, follow-up in 3 months.

## 2024-10-22 DIAGNOSIS — E03.9 ACQUIRED HYPOTHYROIDISM: ICD-10-CM

## 2024-10-22 RX ORDER — LIOTHYRONINE SODIUM 25 UG/1
25 TABLET ORAL DAILY
Qty: 30 TABLET | Refills: 11 | Status: SHIPPED | OUTPATIENT
Start: 2024-10-22 | End: 2025-10-22

## 2024-10-29 ENCOUNTER — TELEPHONE (OUTPATIENT)
Dept: PRIMARY CARE | Facility: CLINIC | Age: 54
End: 2024-10-29
Payer: COMMERCIAL

## 2024-10-29 DIAGNOSIS — J02.0 STREP PHARYNGITIS: Primary | ICD-10-CM

## 2024-10-29 RX ORDER — AZITHROMYCIN 250 MG/1
TABLET, FILM COATED ORAL
Qty: 6 TABLET | Refills: 0 | Status: SHIPPED | OUTPATIENT
Start: 2024-10-29 | End: 2024-11-03

## 2024-11-04 ENCOUNTER — HOSPITAL ENCOUNTER (OUTPATIENT)
Dept: CARDIOLOGY | Facility: HOSPITAL | Age: 54
Discharge: HOME | End: 2024-11-04
Payer: COMMERCIAL

## 2024-11-04 DIAGNOSIS — I47.29 VENTRICULAR TACHYCARDIA (PAROXYSMAL) (MULTI): ICD-10-CM

## 2024-11-04 DIAGNOSIS — Z95.810 PRESENCE OF AUTOMATIC CARDIOVERTER/DEFIBRILLATOR (AICD): ICD-10-CM

## 2024-12-10 ENCOUNTER — HOSPITAL ENCOUNTER (OUTPATIENT)
Dept: CARDIOLOGY | Facility: HOSPITAL | Age: 54
Discharge: HOME | End: 2024-12-10
Payer: COMMERCIAL

## 2024-12-10 DIAGNOSIS — I47.29 VENTRICULAR TACHYCARDIA (PAROXYSMAL) (MULTI): ICD-10-CM

## 2024-12-10 DIAGNOSIS — Z95.810 PRESENCE OF AUTOMATIC CARDIOVERTER/DEFIBRILLATOR (AICD): ICD-10-CM

## 2024-12-10 PROCEDURE — 93295 DEV INTERROG REMOTE 1/2/MLT: CPT | Performed by: INTERNAL MEDICINE

## 2024-12-10 PROCEDURE — 93296 REM INTERROG EVL PM/IDS: CPT

## 2024-12-30 ENCOUNTER — HOSPITAL ENCOUNTER (OUTPATIENT)
Dept: CARDIOLOGY | Facility: HOSPITAL | Age: 54
Discharge: HOME | End: 2024-12-30
Payer: COMMERCIAL

## 2024-12-30 DIAGNOSIS — I47.29 VENTRICULAR TACHYCARDIA (PAROXYSMAL) (MULTI): ICD-10-CM

## 2024-12-30 DIAGNOSIS — Z95.810 PRESENCE OF AUTOMATIC CARDIOVERTER/DEFIBRILLATOR (AICD): ICD-10-CM

## 2025-01-15 ENCOUNTER — HOSPITAL ENCOUNTER (EMERGENCY)
Facility: HOSPITAL | Age: 55
Discharge: LEFT BEFORE DISCHARGE | End: 2025-01-15
Attending: EMERGENCY MEDICINE
Payer: COMMERCIAL

## 2025-01-15 ENCOUNTER — APPOINTMENT (OUTPATIENT)
Dept: CARDIOLOGY | Facility: HOSPITAL | Age: 55
End: 2025-01-15
Payer: COMMERCIAL

## 2025-01-15 ENCOUNTER — APPOINTMENT (OUTPATIENT)
Dept: RADIOLOGY | Facility: HOSPITAL | Age: 55
End: 2025-01-15
Payer: COMMERCIAL

## 2025-01-15 VITALS
TEMPERATURE: 98.1 F | HEART RATE: 107 BPM | BODY MASS INDEX: 31.5 KG/M2 | SYSTOLIC BLOOD PRESSURE: 192 MMHG | HEIGHT: 70 IN | WEIGHT: 220 LBS | DIASTOLIC BLOOD PRESSURE: 94 MMHG | OXYGEN SATURATION: 100 % | RESPIRATION RATE: 19 BRPM

## 2025-01-15 DIAGNOSIS — J44.1 COPD EXACERBATION (MULTI): ICD-10-CM

## 2025-01-15 DIAGNOSIS — R79.89 ELEVATED TROPONIN: ICD-10-CM

## 2025-01-15 DIAGNOSIS — Z53.29 LEFT AGAINST MEDICAL ADVICE: ICD-10-CM

## 2025-01-15 DIAGNOSIS — B33.8 RSV (RESPIRATORY SYNCYTIAL VIRUS INFECTION): Primary | ICD-10-CM

## 2025-01-15 DIAGNOSIS — E03.9 ACQUIRED HYPOTHYROIDISM: ICD-10-CM

## 2025-01-15 LAB
ALBUMIN SERPL BCP-MCNC: 4.2 G/DL (ref 3.4–5)
ALP SERPL-CCNC: 60 U/L (ref 33–120)
ALT SERPL W P-5'-P-CCNC: 12 U/L (ref 10–52)
ANION GAP SERPL CALC-SCNC: 12 MMOL/L (ref 10–20)
AST SERPL W P-5'-P-CCNC: 18 U/L (ref 9–39)
ATRIAL RATE: 78 BPM
ATRIAL RATE: 83 BPM
BASOPHILS # BLD AUTO: 0.05 X10*3/UL (ref 0–0.1)
BASOPHILS NFR BLD AUTO: 0.7 %
BILIRUB SERPL-MCNC: 0.3 MG/DL (ref 0–1.2)
BNP SERPL-MCNC: 34 PG/ML (ref 0–99)
BUN SERPL-MCNC: 17 MG/DL (ref 6–23)
CALCIUM SERPL-MCNC: 9 MG/DL (ref 8.6–10.3)
CARDIAC TROPONIN I PNL SERPL HS: 36 NG/L (ref 0–20)
CARDIAC TROPONIN I PNL SERPL HS: 38 NG/L (ref 0–20)
CHLORIDE SERPL-SCNC: 107 MMOL/L (ref 98–107)
CO2 SERPL-SCNC: 22 MMOL/L (ref 21–32)
CREAT SERPL-MCNC: 0.96 MG/DL (ref 0.5–1.3)
EGFRCR SERPLBLD CKD-EPI 2021: >90 ML/MIN/1.73M*2
EOSINOPHIL # BLD AUTO: 0.19 X10*3/UL (ref 0–0.7)
EOSINOPHIL NFR BLD AUTO: 2.5 %
ERYTHROCYTE [DISTWIDTH] IN BLOOD BY AUTOMATED COUNT: 12.7 % (ref 11.5–14.5)
FLUAV RNA RESP QL NAA+PROBE: NOT DETECTED
FLUBV RNA RESP QL NAA+PROBE: NOT DETECTED
GLUCOSE SERPL-MCNC: 113 MG/DL (ref 74–99)
HCT VFR BLD AUTO: 41 % (ref 41–52)
HGB BLD-MCNC: 14.4 G/DL (ref 13.5–17.5)
IMM GRANULOCYTES # BLD AUTO: 0.01 X10*3/UL (ref 0–0.7)
IMM GRANULOCYTES NFR BLD AUTO: 0.1 % (ref 0–0.9)
INR PPP: 1.3 (ref 0.9–1.1)
LACTATE SERPL-SCNC: 2.1 MMOL/L (ref 0.4–2)
LYMPHOCYTES # BLD AUTO: 1.25 X10*3/UL (ref 1.2–4.8)
LYMPHOCYTES NFR BLD AUTO: 16.5 %
MAGNESIUM SERPL-MCNC: 1.93 MG/DL (ref 1.6–2.4)
MCH RBC QN AUTO: 34.8 PG (ref 26–34)
MCHC RBC AUTO-ENTMCNC: 35.1 G/DL (ref 32–36)
MCV RBC AUTO: 99 FL (ref 80–100)
MONOCYTES # BLD AUTO: 0.7 X10*3/UL (ref 0.1–1)
MONOCYTES NFR BLD AUTO: 9.2 %
NEUTROPHILS # BLD AUTO: 5.37 X10*3/UL (ref 1.2–7.7)
NEUTROPHILS NFR BLD AUTO: 71 %
NRBC BLD-RTO: 0 /100 WBCS (ref 0–0)
P AXIS: 40 DEGREES
P AXIS: 41 DEGREES
P OFFSET: 196 MS
P OFFSET: 197 MS
P ONSET: 136 MS
P ONSET: 138 MS
PLATELET # BLD AUTO: 191 X10*3/UL (ref 150–450)
POTASSIUM SERPL-SCNC: 3.9 MMOL/L (ref 3.5–5.3)
PR INTERVAL: 152 MS
PR INTERVAL: 162 MS
PROT SERPL-MCNC: 6.6 G/DL (ref 6.4–8.2)
PROTHROMBIN TIME: 14.6 SECONDS (ref 9.8–12.8)
Q ONSET: 214 MS
Q ONSET: 217 MS
QRS COUNT: 13 BEATS
QRS COUNT: 14 BEATS
QRS DURATION: 94 MS
QRS DURATION: 98 MS
QT INTERVAL: 388 MS
QT INTERVAL: 414 MS
QTC CALCULATION(BAZETT): 442 MS
QTC CALCULATION(BAZETT): 486 MS
QTC FREDERICIA: 423 MS
QTC FREDERICIA: 461 MS
R AXIS: 56 DEGREES
R AXIS: 58 DEGREES
RBC # BLD AUTO: 4.14 X10*6/UL (ref 4.5–5.9)
RSV RNA RESP QL NAA+PROBE: DETECTED
SARS-COV-2 RNA RESP QL NAA+PROBE: NOT DETECTED
SODIUM SERPL-SCNC: 137 MMOL/L (ref 136–145)
T AXIS: 34 DEGREES
T AXIS: 44 DEGREES
T OFFSET: 411 MS
T OFFSET: 421 MS
VENTRICULAR RATE: 78 BPM
VENTRICULAR RATE: 83 BPM
WBC # BLD AUTO: 7.6 X10*3/UL (ref 4.4–11.3)

## 2025-01-15 PROCEDURE — 83880 ASSAY OF NATRIURETIC PEPTIDE: CPT | Performed by: EMERGENCY MEDICINE

## 2025-01-15 PROCEDURE — 96361 HYDRATE IV INFUSION ADD-ON: CPT

## 2025-01-15 PROCEDURE — 96375 TX/PRO/DX INJ NEW DRUG ADDON: CPT

## 2025-01-15 PROCEDURE — 71045 X-RAY EXAM CHEST 1 VIEW: CPT | Performed by: RADIOLOGY

## 2025-01-15 PROCEDURE — 85610 PROTHROMBIN TIME: CPT | Performed by: EMERGENCY MEDICINE

## 2025-01-15 PROCEDURE — 94640 AIRWAY INHALATION TREATMENT: CPT

## 2025-01-15 PROCEDURE — 83605 ASSAY OF LACTIC ACID: CPT | Performed by: EMERGENCY MEDICINE

## 2025-01-15 PROCEDURE — 80053 COMPREHEN METABOLIC PANEL: CPT | Performed by: EMERGENCY MEDICINE

## 2025-01-15 PROCEDURE — 96365 THER/PROPH/DIAG IV INF INIT: CPT

## 2025-01-15 PROCEDURE — 71045 X-RAY EXAM CHEST 1 VIEW: CPT

## 2025-01-15 PROCEDURE — 2500000002 HC RX 250 W HCPCS SELF ADMINISTERED DRUGS (ALT 637 FOR MEDICARE OP, ALT 636 FOR OP/ED): Performed by: EMERGENCY MEDICINE

## 2025-01-15 PROCEDURE — 2500000004 HC RX 250 GENERAL PHARMACY W/ HCPCS (ALT 636 FOR OP/ED): Performed by: EMERGENCY MEDICINE

## 2025-01-15 PROCEDURE — 36415 COLL VENOUS BLD VENIPUNCTURE: CPT | Performed by: EMERGENCY MEDICINE

## 2025-01-15 PROCEDURE — 87637 SARSCOV2&INF A&B&RSV AMP PRB: CPT | Performed by: EMERGENCY MEDICINE

## 2025-01-15 PROCEDURE — 83735 ASSAY OF MAGNESIUM: CPT | Performed by: EMERGENCY MEDICINE

## 2025-01-15 PROCEDURE — 84484 ASSAY OF TROPONIN QUANT: CPT | Performed by: EMERGENCY MEDICINE

## 2025-01-15 PROCEDURE — 93005 ELECTROCARDIOGRAM TRACING: CPT

## 2025-01-15 PROCEDURE — 99285 EMERGENCY DEPT VISIT HI MDM: CPT | Mod: 25 | Performed by: EMERGENCY MEDICINE

## 2025-01-15 PROCEDURE — 2500000001 HC RX 250 WO HCPCS SELF ADMINISTERED DRUGS (ALT 637 FOR MEDICARE OP): Performed by: EMERGENCY MEDICINE

## 2025-01-15 PROCEDURE — 84443 ASSAY THYROID STIM HORMONE: CPT | Performed by: INTERNAL MEDICINE

## 2025-01-15 PROCEDURE — 85025 COMPLETE CBC W/AUTO DIFF WBC: CPT | Performed by: EMERGENCY MEDICINE

## 2025-01-15 RX ORDER — IPRATROPIUM BROMIDE AND ALBUTEROL SULFATE 2.5; .5 MG/3ML; MG/3ML
3 SOLUTION RESPIRATORY (INHALATION) EVERY 20 MIN
Status: COMPLETED | OUTPATIENT
Start: 2025-01-15 | End: 2025-01-15

## 2025-01-15 RX ORDER — PREDNISONE 20 MG/1
60 TABLET ORAL DAILY
Qty: 15 TABLET | Refills: 0 | Status: SHIPPED | OUTPATIENT
Start: 2025-01-15 | End: 2025-01-17 | Stop reason: SDUPTHER

## 2025-01-15 RX ORDER — MAGNESIUM SULFATE HEPTAHYDRATE 40 MG/ML
2 INJECTION, SOLUTION INTRAVENOUS ONCE
Status: COMPLETED | OUTPATIENT
Start: 2025-01-15 | End: 2025-01-15

## 2025-01-15 RX ORDER — ALBUTEROL SULFATE 90 UG/1
1-2 INHALANT RESPIRATORY (INHALATION) EVERY 6 HOURS PRN
Qty: 18 G | Refills: 0 | Status: SHIPPED | OUTPATIENT
Start: 2025-01-15 | End: 2025-01-17 | Stop reason: SDUPTHER

## 2025-01-15 RX ORDER — ACETAMINOPHEN 325 MG/1
975 TABLET ORAL ONCE
Status: COMPLETED | OUTPATIENT
Start: 2025-01-15 | End: 2025-01-15

## 2025-01-15 RX ADMIN — IPRATROPIUM BROMIDE AND ALBUTEROL SULFATE 3 ML: .5; 3 SOLUTION RESPIRATORY (INHALATION) at 11:22

## 2025-01-15 RX ADMIN — SODIUM CHLORIDE 1000 ML: 9 INJECTION, SOLUTION INTRAVENOUS at 12:18

## 2025-01-15 RX ADMIN — ACETAMINOPHEN 975 MG: 325 TABLET ORAL at 12:17

## 2025-01-15 RX ADMIN — MAGNESIUM SULFATE HEPTAHYDRATE 2 G: 40 INJECTION, SOLUTION INTRAVENOUS at 11:15

## 2025-01-15 RX ADMIN — IPRATROPIUM BROMIDE AND ALBUTEROL SULFATE 3 ML: .5; 3 SOLUTION RESPIRATORY (INHALATION) at 11:21

## 2025-01-15 RX ADMIN — IPRATROPIUM BROMIDE AND ALBUTEROL SULFATE 3 ML: .5; 3 SOLUTION RESPIRATORY (INHALATION) at 11:19

## 2025-01-15 RX ADMIN — METHYLPREDNISOLONE SODIUM SUCCINATE 125 MG: 125 INJECTION, POWDER, FOR SOLUTION INTRAMUSCULAR; INTRAVENOUS at 11:15

## 2025-01-15 SDOH — HEALTH STABILITY: MENTAL HEALTH

## 2025-01-15 SDOH — SOCIAL STABILITY: SOCIAL NETWORK

## 2025-01-15 SDOH — SOCIAL STABILITY: SOCIAL INSECURITY

## 2025-01-15 ASSESSMENT — LIFESTYLE VARIABLES
EVER FELT BAD OR GUILTY ABOUT YOUR DRINKING: NO
HAVE PEOPLE ANNOYED YOU BY CRITICIZING YOUR DRINKING: NO
TOTAL SCORE: 0
HAVE YOU EVER FELT YOU SHOULD CUT DOWN ON YOUR DRINKING: NO
EVER HAD A DRINK FIRST THING IN THE MORNING TO STEADY YOUR NERVES TO GET RID OF A HANGOVER: NO

## 2025-01-15 ASSESSMENT — PAIN DESCRIPTION - PAIN TYPE: TYPE: ACUTE PAIN

## 2025-01-15 ASSESSMENT — PAIN SCALES - GENERAL
PAINLEVEL_OUTOF10: 7
PAINLEVEL_OUTOF10: 10 - WORST POSSIBLE PAIN

## 2025-01-15 ASSESSMENT — PAIN - FUNCTIONAL ASSESSMENT: PAIN_FUNCTIONAL_ASSESSMENT: 0-10

## 2025-01-15 ASSESSMENT — PAIN DESCRIPTION - DESCRIPTORS: DESCRIPTORS: ACHING

## 2025-01-15 ASSESSMENT — COLUMBIA-SUICIDE SEVERITY RATING SCALE - C-SSRS
1. IN THE PAST MONTH, HAVE YOU WISHED YOU WERE DEAD OR WISHED YOU COULD GO TO SLEEP AND NOT WAKE UP?: NO
2. HAVE YOU ACTUALLY HAD ANY THOUGHTS OF KILLING YOURSELF?: NO
6. HAVE YOU EVER DONE ANYTHING, STARTED TO DO ANYTHING, OR PREPARED TO DO ANYTHING TO END YOUR LIFE?: NO

## 2025-01-15 ASSESSMENT — PAIN DESCRIPTION - LOCATION: LOCATION: HEAD

## 2025-01-15 NOTE — ED PROVIDER NOTES
HPI   Chief Complaint   Patient presents with    Flu Symptoms     Cough, congestion, sinus pressure, and some wheezing, hx of copd         History provided by:  Patient    Chief Complaint   Patient presents with    Flu Symptoms     Cough, congestion, sinus pressure, and some wheezing, hx of copd       History of Present Illness:  Hira Mojica is a 54 y.o. male presents with cough and shortness of breath.  It has been going on for 2 days.  No fevers but he does have chills.  The cough is productive with yellowish sputum.  Positive sick contacts.  No trauma or travel.  No treatment prior to arrival.      PMFSH:   As per HPI, otherwise nurses notes reviewed in EMR.    Past Medical History:   Past Medical History:   Diagnosis Date    Asthma     COPD (chronic obstructive pulmonary disease) (Multi)     Hyperlipidemia       Past Surgical History:   Past Surgical History:   Procedure Laterality Date    APPENDECTOMY  01/23/2017    Appendectomy    CARDIAC CATHETERIZATION      CARDIAC CATHETERIZATION N/A 5/22/2024    Procedure: Left Heart Cath, With LV;  Surgeon: Esthela Trimble MD;  Location: [facility];  Service: Cardiovascular;  Laterality: N/A;  possible PCI    CARDIAC CATHETERIZATION N/A 5/22/2024    Procedure: PCI HOME Stent- Coronary;  Surgeon: Esthela Trmible MD;  Location: [facility];  Service: Cardiovascular;  Laterality: N/A;    CHOLECYSTECTOMY  01/23/2017    Cholecystectomy    CORONARY ANGIOPLASTY      CORONARY STENT PLACEMENT      HERNIA REPAIR  01/23/2017    Inguinal Hernia Repair    HERNIA REPAIR  02/16/2017    Hernia Repair    INSERT / REPLACE / REMOVE PACEMAKER      OTHER SURGICAL HISTORY  05/10/2021    Testicular surgery    OTHER SURGICAL HISTORY  05/10/2021    Coronary artery stent placement    OTHER SURGICAL HISTORY  06/07/2021    Inguinal hernia repair    OTHER SURGICAL HISTORY  07/27/2022    Cardiac catheterization    OTHER SURGICAL HISTORY  07/27/2022    Colonoscopy    OTHER  SURGICAL HISTORY  2022    Cyst excision    OTHER SURGICAL HISTORY  2022    Percutaneous transluminal coronary angioplasty    ROTATOR CUFF REPAIR  2017    Rotator Cuff Repair    TOTAL THYROIDECTOMY  2017    Thyroid Surgery Total Thyroidectomy      Family History:   Family History   Problem Relation Name Age of Onset    Other (arteriosclerotic CVD) Mother Telma     Other (cardiac disorder) Mother Telma     Stroke Mother Telma     Cancer Mother Telma     Colon cancer Mother Telma         had colonectomy which seemed to stop the spread    Abnormal EKG Mother Telma     Angina Mother Telma     Arrhythmia Mother Telma     Atrial fibrillation Mother Telma     Diabetes type II Mother Telma     Heart attack Mother Telma     Heart failure Mother Telma     Hyperlipidemia Mother Telma     Hypertension Mother Telma     Thyroid disease Mother Telma     Other (arteriosclerotic CVD) Father DARCI     Stroke Father DARCI     Cancer Father DARCI     Asthma Father DARCI     Hypertension Father DARCI     Lung disease Father DARCI     Other (arteriosclerotic CVD) Brother russ     Heart attack Brother russ     Heart disease Brother russ     Obesity Brother russ     Diabetes Brother Yaron     Thyroid disease Mother's Sister reyes     Other (cardiac disorder) Other grandmother     Colon cancer Other grandmother         was too late for any remedies and passed away within a month of dx    Cancer Other aunt       Social History:    Social History     Tobacco Use    Smoking status: Former     Current packs/day: 0.00     Average packs/day: 3.0 packs/day for 20.0 years (60.0 ttl pk-yrs)     Types: Cigarettes     Start date:      Quit date:      Years since quittin.0    Smokeless tobacco: Never   Vaping Use    Vaping status: Never Used   Substance Use Topics    Alcohol use: Not Currently    Drug use: Never     Allergies:   Allergies   Allergen Reactions    Penicillins Hives    Ketorolac Other     Adverse reaction     Current  Outpatient Medications   Medication Instructions    albuterol 90 mcg/actuation inhaler 1-2 puffs, inhalation, 4 times daily    aspirin 81 mg EC tablet 1 tablet, oral, Daily    atorvastatin (LIPITOR) 80 mg, oral, Nightly    azelastine (Astelin) 137 mcg (0.1 %) nasal spray 1 spray, Each Nostril, 2 times daily, Use in each nostril as directed    budesonide-formoteroL (Symbicort) 160-4.5 mcg/actuation inhaler 2 puffs, inhalation, 2 times daily, RINSE MOUTH AFTER USE.    carvedilol (COREG) 25 mg, oral, 2 times daily    clopidogrel (PLAVIX) 75 mg, oral, Daily    hydrALAZINE (APRESOLINE) 50 mg, oral, 2 times daily    isosorbide mononitrate ER (Imdur) 30 mg 24 hr tablet 1 tablet, oral, Daily, In morning    levothyroxine (SYNTHROID, LEVOXYL) 200 mcg, oral, Daily    liothyronine (CYTOMEL) 25 mcg, oral, Daily    magnesium oxide 400 mg, oral, Daily    nitroglycerin (NITROSTAT) 0.4 mg, sublingual, Every 5 min PRN, PLACE 1 TABLET UNDER THE TONGUE EVERY 5 MINUTES FOR UP TO 3 DOSES AS NEEDED FOR CHEST PAIN.CALL 911 IF PAIN PERSISTS.    torsemide (DEMADEX) 20 mg, oral, Daily    valsartan (DIOVAN) 160 mg, oral, Daily              Patient History   Past Medical History:   Diagnosis Date    Asthma     COPD (chronic obstructive pulmonary disease) (Multi)     Hyperlipidemia      Past Surgical History:   Procedure Laterality Date    APPENDECTOMY  01/23/2017    Appendectomy    CARDIAC CATHETERIZATION      CARDIAC CATHETERIZATION N/A 5/22/2024    Procedure: Left Heart Cath, With LV;  Surgeon: Esthela Trimble MD;  Location: [facility];  Service: Cardiovascular;  Laterality: N/A;  possible PCI    CARDIAC CATHETERIZATION N/A 5/22/2024    Procedure: PCI HOME Stent- Coronary;  Surgeon: Esthela Trimble MD;  Location: [facility];  Service: Cardiovascular;  Laterality: N/A;    CHOLECYSTECTOMY  01/23/2017    Cholecystectomy    CORONARY ANGIOPLASTY      CORONARY STENT PLACEMENT      HERNIA REPAIR  01/23/2017    Inguinal Hernia Repair     HERNIA REPAIR  02/16/2017    Hernia Repair    INSERT / REPLACE / REMOVE PACEMAKER      OTHER SURGICAL HISTORY  05/10/2021    Testicular surgery    OTHER SURGICAL HISTORY  05/10/2021    Coronary artery stent placement    OTHER SURGICAL HISTORY  06/07/2021    Inguinal hernia repair    OTHER SURGICAL HISTORY  07/27/2022    Cardiac catheterization    OTHER SURGICAL HISTORY  07/27/2022    Colonoscopy    OTHER SURGICAL HISTORY  07/27/2022    Cyst excision    OTHER SURGICAL HISTORY  07/27/2022    Percutaneous transluminal coronary angioplasty    ROTATOR CUFF REPAIR  01/23/2017    Rotator Cuff Repair    TOTAL THYROIDECTOMY  01/23/2017    Thyroid Surgery Total Thyroidectomy     Family History   Problem Relation Name Age of Onset    Other (arteriosclerotic CVD) Mother Telma     Other (cardiac disorder) Mother Telma     Stroke Mother Telma     Cancer Mother Telma     Colon cancer Mother Telma         had colonectomy which seemed to stop the spread    Abnormal EKG Mother Telma     Angina Mother Telma     Arrhythmia Mother Telma     Atrial fibrillation Mother Telma     Diabetes type II Mother Telma     Heart attack Mother Telma     Heart failure Mother Telma     Hyperlipidemia Mother Telma     Hypertension Mother Telma     Thyroid disease Mother Telma     Other (arteriosclerotic CVD) Father DARCI     Stroke Father DARCI     Cancer Father DARCI     Asthma Father DARCI     Hypertension Father DARCI     Lung disease Father DARCI     Other (arteriosclerotic CVD) Brother russ     Heart attack Brother russ     Heart disease Brother russ     Obesity Brother russ     Diabetes Brother Yaron     Thyroid disease Mother's Sister reyes     Other (cardiac disorder) Other grandmother     Colon cancer Other grandmother         was too late for any remedies and passed away within a month of dx    Cancer Other aunt      Social History     Tobacco Use    Smoking status: Former     Current packs/day: 0.00     Average packs/day: 3.0 packs/day for 20.0 years (60.0 ttl  "pk-yrs)     Types: Cigarettes     Start date:      Quit date: 2013     Years since quittin.0    Smokeless tobacco: Never   Vaping Use    Vaping status: Never Used   Substance Use Topics    Alcohol use: Not Currently    Drug use: Never       Physical Exam   ED Triage Vitals [01/15/25 1027]   Temperature Heart Rate Respirations BP   36.7 °C (98.1 °F) 93 18 (!) 166/104      Pulse Ox Temp Source Heart Rate Source Patient Position   98 % Temporal Monitor --      BP Location FiO2 (%)     -- --       Physical Exam  Physical Exam:    ED Triage Vitals [01/15/25 1027]   Temperature Heart Rate Respirations BP   36.7 °C (98.1 °F) 93 18 (!) 166/104      Pulse Ox Temp Source Heart Rate Source Patient Position   98 % Temporal Monitor --      BP Location FiO2 (%)     -- --         Patient Vitals for the past 24 hrs:   BP Temp Temp src Pulse Resp SpO2 Height Weight   01/15/25 1300 (!) 155/92 -- -- 70 18 98 % -- --   01/15/25 1215 136/77 -- -- 80 19 98 % -- --   01/15/25 1125 143/77 -- -- -- -- -- -- --   01/15/25 1121 -- -- -- -- 17 97 % -- --   01/15/25 1027 (!) 166/104 36.7 °C (98.1 °F) Temporal 93 18 98 % 1.778 m (5' 10\") 99.8 kg (220 lb)       Constitutional: Vital signs per nursing notes.  Well developed, well nourished.  Mild acute distress.    Psychiatric: alert and oriented to person, place, and time; no abnormalities of mood or affect; memory intact  Eyes: PERRL; conjunctivae and lids normal; EOMI  ENT: otoscopic exam of external canal and TM´s normal; nasal mucosa, turbinates, and septum normal; mouth, tongue, and pharynx normal; pharynx without edema, exudate, or injection; except clear rhinorrhea  Respiratory: normal respiratory effort and excursion; no rales, rhonchi, or wheezes; equal but diminished air entry  Cardiovascular: regular rate and rhythm; no murmurs, rubs or gallops; symmetric pulses; no edema; normal capillary refill; distal pulses present  Neurological: normal speech; CN II-XII grossly intact; " normal motor and sensory function; no nystagmus  GI: no masses, tenderness, rebound or guarding; no palpable, pulsatile mass; no organomegaly; no hernia; normal bowel sounds; (-) Leal´s sign; (-) McBurney´s sign; (-) CVA tenderness  Lymphatic: no adenopathy of neck  Musculoskeletal: normal gait and station; normal digits and nails; no gross tendon or ligament injury; normal to palpation; normal strength/tone; neurovascular status intact; (-) Susanne´s sign; (-) straight leg raise  Skin: normal to inspection; normal to palpation; no rash  GCS: 15      ED Course & MDM   Diagnoses as of 01/15/25 1347   RSV (respiratory syncytial virus infection)   COPD exacerbation (Multi)   Elevated troponin   Left against medical advice                 No data recorded     Stella Coma Scale Score: 15 (01/15/25 1050 : Milagros Dutta RN)                           Medical Decision Making  Medical Decision Making:    EKG: My interpretation of EKG is sinus rhythm at 78 bpm with nonspecific ST-T changes and occasional PVC              My interpretation of second EKG is normal sinus rhythm at 83 bpm with nonspecific ST-T changes    Labs:   Labs Reviewed   CBC WITH AUTO DIFFERENTIAL - Abnormal       Result Value    WBC 7.6      nRBC 0.0      RBC 4.14 (*)     Hemoglobin 14.4      Hematocrit 41.0      MCV 99      MCH 34.8 (*)     MCHC 35.1      RDW 12.7      Platelets 191      Neutrophils % 71.0      Immature Granulocytes %, Automated 0.1      Lymphocytes % 16.5      Monocytes % 9.2      Eosinophils % 2.5      Basophils % 0.7      Neutrophils Absolute 5.37      Immature Granulocytes Absolute, Automated 0.01      Lymphocytes Absolute 1.25      Monocytes Absolute 0.70      Eosinophils Absolute 0.19      Basophils Absolute 0.05     COMPREHENSIVE METABOLIC PANEL - Abnormal    Glucose 113 (*)     Sodium 137      Potassium 3.9      Chloride 107      Bicarbonate 22      Anion Gap 12      Urea Nitrogen 17      Creatinine 0.96      eGFR >90       Calcium 9.0      Albumin 4.2      Alkaline Phosphatase 60      Total Protein 6.6      AST 18      Bilirubin, Total 0.3      ALT 12     PROTIME-INR - Abnormal    Protime 14.6 (*)     INR 1.3 (*)    LACTATE - Abnormal    Lactate 2.1 (*)     Narrative:     Venipuncture immediately after or during the administration of Metamizole may lead to falsely low results. Testing should be performed immediately prior to Metamizole dosing.   RSV PCR - Abnormal    RSV PCR Detected (*)     Narrative:     This assay is an FDA-cleared, in vitro diagnostic nucleic acid amplification test for the detection of RSV from nasopharyngeal specimens, and has been validated for use at [facility]. Negative results do not preclude RSV infections, and should not be used as the sole basis for diagnosis, treatment, or other management decisions. If Influenza A/B and RSV PCR results are negative, testing for Parainfluenza virus, Adenovirus and Metapneumovirus is routinely performed for pediatric oncology and intensive care inpatients at [facility] [place] and is available on other patients by placing an add-on request.       SERIAL TROPONIN-INITIAL - Abnormal    Troponin I, High Sensitivity 36 (*)     Narrative:     Less than 99th percentile of normal range cutoff-  Female and children under 18 years old <14 ng/L; Male <21 ng/L: Negative  Repeat testing should be performed if clinically indicated.     Female and children under 18 years old 14-50 ng/L; Male 21-50 ng/L:  Consistent with possible cardiac damage and possible increased clinical   risk. Serial measurements may help to assess extent of myocardial damage.     >50 ng/L: Consistent with cardiac damage, increased clinical risk and  myocardial infarction. Serial measurements may help assess extent of   myocardial damage.      NOTE: Children less than 1 year old may have higher baseline troponin   levels and results should be interpreted in conjunction with the overall   clinical  context.     NOTE: Troponin I testing is performed using a different   testing methodology at [facility] than at other   [facility]. Direct result comparisons should only   be made within the same method.   SERIAL TROPONIN, 1 HOUR - Abnormal    Troponin I, High Sensitivity 38 (*)     Narrative:     Less than 99th percentile of normal range cutoff-  Female and children under 18 years old <14 ng/L; Male <21 ng/L: Negative  Repeat testing should be performed if clinically indicated.     Female and children under 18 years old 14-50 ng/L; Male 21-50 ng/L:  Consistent with possible cardiac damage and possible increased clinical   risk. Serial measurements may help to assess extent of myocardial damage.     >50 ng/L: Consistent with cardiac damage, increased clinical risk and  myocardial infarction. Serial measurements may help assess extent of   myocardial damage.      NOTE: Children less than 1 year old may have higher baseline troponin   levels and results should be interpreted in conjunction with the overall   clinical context.     NOTE: Troponin I testing is performed using a different   testing methodology at [facility] than at other   [facility]. Direct result comparisons should only   be made within the same method.   MAGNESIUM - Normal    Magnesium 1.93     B-TYPE NATRIURETIC PEPTIDE - Normal    BNP 34      Narrative:        <100 pg/mL - Heart failure unlikely  100-299 pg/mL - Intermediate probability of acute heart                  failure exacerbation. Correlate with clinical                  context and patient history.    >=300 pg/mL - Heart Failure likely. Correlate with clinical                  context and patient history.    BNP testing is performed using different testing methodology at [facility] than at other [facility]. Direct result comparisons should only be made within the same method.      SARS-COV-2 AND INFLUENZA A/B PCR - Normal    Flu A  Result Not Detected      Flu B Result Not Detected      Coronavirus 2019, PCR Not Detected      Narrative:     This assay has received FDA Emergency Use Authorization (EUA) and  is only authorized for the duration of time that circumstances exist to justify the authorization of the emergency use of in vitro diagnostic tests for the detection of SARS-CoV-2 virus and/or diagnosis of COVID-19 infection under section 564(b)(1) of the Act, 21 U.S.C. 360bbb-3(b)(1). Testing for SARS-CoV-2 is only recommended for patients who meet current clinical and/or epidemiological criteria as defined by federal, state, or local public health directives. This assay is an in vitro diagnostic nucleic acid amplification test for the qualitative detection of SARS-CoV-2, Influenza A, and Influenza B from nasopharyngeal specimens and has been validated for use at [facility]. Negative results do not preclude COVID-19 infections or Influenza A/B infections, and should not be used as the sole basis for diagnosis, treatment, or other management decisions. If Influenza A/B and RSV PCR results are negative, testing for Parainfluenza virus, Adenovirus and Metapneumovirus is routinely performed for [facility] [place] pediatric oncology and intensive care inpatients, and is available on other patients by placing an add-on request.    TROPONIN SERIES- (INITIAL, 1 HR)    Narrative:     The following orders were created for panel order Troponin I Series, High Sensitivity (0, 1 HR).  Procedure                               Abnormality         Status                     ---------                               -----------         ------                     Troponin I, High Sensiti...[[id number]]  Abnormal            Final result               Troponin, High Sensitivi...[[id number]]  Abnormal            Final result                 Please view results for these tests on the individual orders.   LACTATE       Diagnostic Imaging:   XR chest 1 view    Final Result   Allowing for the aforementioned limitation, no acute cardiopulmonary   disease.        Signed by: Carlos Brice 1/15/2025 11:18 AM   Dictation workstation:   [id number]          ED Medication Administration:   Medications   ipratropium-albuteroL (Duo-Neb) 0.5-2.5 mg/3 mL nebulizer solution 3 mL (3 mL nebulization Given 1/15/25 1122)   methylPREDNISolone sod succinate (SOLU-Medrol) injection 125 mg (125 mg intravenous Given 1/15/25 1115)   magnesium sulfate 2 g in sterile water for injection 50 mL (0 g intravenous Stopped 1/15/25 1144)   sodium chloride 0.9 % bolus 1,000 mL (1,000 mL intravenous New Bag 1/15/25 1218)   acetaminophen (Tylenol) tablet 975 mg (975 mg oral Given 1/15/25 1217)       ED Course:     Hira Mojica is a 54 y.o. male presents with shortness of breath.    Differential Diagnoses Considered:  COPD exacerbation, pneumonia, viral syndrome, ACS, arrhythmia      Diagnoses as of 01/15/25 1347   RSV (respiratory syncytial virus infection)   COPD exacerbation (Multi)   Elevated troponin   Left against medical advice       Abnormal Labs Reviewed   CBC WITH AUTO DIFFERENTIAL - Abnormal; Notable for the following components:       Result Value    RBC 4.14 (*)     MCH 34.8 (*)     All other components within normal limits   COMPREHENSIVE METABOLIC PANEL - Abnormal; Notable for the following components:    Glucose 113 (*)     All other components within normal limits   PROTIME-INR - Abnormal; Notable for the following components:    Protime 14.6 (*)     INR 1.3 (*)     All other components within normal limits   LACTATE - Abnormal; Notable for the following components:    Lactate 2.1 (*)     All other components within normal limits    Narrative:     Venipuncture immediately after or during the administration of Metamizole may lead to falsely low results. Testing should be performed immediately prior to Metamizole dosing.   RSV PCR - Abnormal; Notable for the following components:     RSV PCR Detected (*)     All other components within normal limits    Narrative:     This assay is an FDA-cleared, in vitro diagnostic nucleic acid amplification test for the detection of RSV from nasopharyngeal specimens, and has been validated for use at [facility]. Negative results do not preclude RSV infections, and should not be used as the sole basis for diagnosis, treatment, or other management decisions. If Influenza A/B and RSV PCR results are negative, testing for Parainfluenza virus, Adenovirus and Metapneumovirus is routinely performed for [place]ology and [place] [facility] [place] and is available on other patients by placing an add-on request.       SERIAL TROPONIN-INITIAL - Abnormal; Notable for the following components:    Troponin I, High Sensitivity 36 (*)     All other components within normal limits    Narrative:     Less than 99th percentile of normal range cutoff-  Female and children under 18 years old <14 ng/L; Male <21 ng/L: Negative  Repeat testing should be performed if clinically indicated.     Female and children under 18 years old 14-50 ng/L; Male 21-50 ng/L:  Consistent with possible cardiac damage and possible increased clinical   risk. Serial measurements may help to assess extent of myocardial damage.     >50 ng/L: Consistent with cardiac damage, increased clinical risk and  myocardial infarction. Serial measurements may help assess extent of   myocardial damage.      NOTE: Children less than 1 year old may have higher baseline troponin   levels and results should be interpreted in conjunction with the overall   clinical context.     NOTE: Troponin I testing is performed using a different   testing methodology at [facility] than at [facility]. Direct result comparisons should only   be made within the same method.   SERIAL TROPONIN, 1 HOUR - Abnormal; Notable for the following components:    Troponin I, High  Sensitivity 38 (*)     All other components within normal limits    Narrative:     Less than 99th percentile of normal range cutoff-  Female and children under 18 years old <14 ng/L; Male <21 ng/L: Negative  Repeat testing should be performed if clinically indicated.     Female and children under 18 years old 14-50 ng/L; Male 21-50 ng/L:  Consistent with possible cardiac damage and possible increased clinical   risk. Serial measurements may help to assess extent of myocardial damage.     >50 ng/L: Consistent with cardiac damage, increased clinical risk and  myocardial infarction. Serial measurements may help assess extent of   myocardial damage.      NOTE: Children less than 1 year old may have higher baseline troponin   levels and results should be interpreted in conjunction with the overall   clinical context.     NOTE: Troponin I testing is performed using a different   testing methodology at [facility] than at other   [facility] hospitals. Direct result comparisons should only   be made within the same method.       BP (!) 155/92 (01/15/25 1300)    Temp      Pulse 70 (01/15/25 1300)   Resp 18 (01/15/25 1300)    SpO2 98 % (01/15/25 1300)      Patient presents with shortness of breath.    Chest x-ray to evaluate for evidence of pneumonia/pneumothorax/CHF/COPD.     EKG/troponin to evaluate for evidence of arrhythmia/ACS.    Lab work to evaluate for evidence of anemia or significant electrolyte abnormality including hypokalemia, hyperkalemia, hyponatremia, hypernatremia, hyperglycemia or hypoglycemia.     RSV/Influenza/COVID-19 swabs to evaluate for evidence of respective infections.    Considered CT chest, but no CT chest indicated as I considered but do not suspect aortic dissection/pulmonary embolus.    Medications administered:  ED Medication Administration from 01/15/2025 1019 to 01/15/2025 1347         Date/Time Order Dose Route Action Action by     01/15/2025 1115 EST magnesium sulfate 2 g in  sterile water for injection 50 mL 2 g intravenous New Bag Woehrman, A     01/15/2025 1115 EST methylPREDNISolone sod succinate (SOLU-Medrol) injection 125 mg 125 mg intravenous Given Woehrman, A     01/15/2025 1119 EST ipratropium-albuteroL (Duo-Neb) 0.5-2.5 mg/3 mL nebulizer solution 3 mL 3 mL nebulization Given Marvin, C     01/15/2025 1121 EST ipratropium-albuteroL (Duo-Neb) 0.5-2.5 mg/3 mL nebulizer solution 3 mL 3 mL nebulization Given Marvin, C     01/15/2025 1122 EST ipratropium-albuteroL (Duo-Neb) 0.5-2.5 mg/3 mL nebulizer solution 3 mL 3 mL nebulization Given Marvin, C     01/15/2025 1144 EST magnesium sulfate 2 g in sterile water for injection 50 mL 0 g intravenous Stopped Woehrman, A     01/15/2025 1217 EST acetaminophen (Tylenol) tablet 975 mg 975 mg oral Given Woehrman, A     01/15/2025 1218 EST sodium chloride 0.9 % bolus 1,000 mL 1,000 mL intravenous New Bag Woehrman, A            Diagnostic evaluation was completed.  2 sets of high-sensitivity troponin were elevated with the first being 36 and the second being 38.  The significance of this is unclear at this time.  Influenza and COVID are negative.  RSV is positive.  BNP is in the normal range so do not suspect CHF.  Metabolic panel shows an elevated glucose at 113.  Sodium potassium in the normal range.  Renal and liver function are in the normal range.  Lactate is slightly elevated at 2.1.  INR is 1.3.  CBC shows normal white blood cell count with no evidence of anemia.  Platelets are in the normal range.  Chest x-ray shows no acute cardiopulmonary disease.    Patient was treated in the emergency department with multiple breathing treatments, Solu-Medrol, magnesium and acetaminophen.    I suspect the patient is likely suffering from a COPD exacerbation secondary to RSV.    Re-evaluation: Repeat evaluation at 1:30 PM shows the patient is feeling slightly better.  His cough has improved.  However, he does feel short of breath.    He will require  hospitalization for further workup and evaluation.    My plan was to hospitalize the patient.  However, the patient decided that he would rather get a second opinion and that he did not want to stay in the hospital.  I shared with him that he would have to sign out AGAINST MEDICAL ADVICE.    Patient wishes to leave AMA.  He/she appeared rational, alert, appropriate, and exhibited no signs/symptoms of psychosis or suicidal ideation.  Patient has the capacity to make this medical decision.  Patient was aware of his/her suspected diagnosis as suggested by the initial screening exam and acknowledged their understanding for my recommendations (hospitalization, transfer, further testing, medical treatment).  Risks of refusal of recommended care were disclosed to the patient including, but not limited to death, permanent mental or physical impairment, loss of current lifestyle or paralysis.  Welcomed to return to the ED at any time regardless of their ability to pay and was provided follow up instructions.  The patient will leave AMA at this time.       Smoking Cessation    Greater than 3 minutes of smoking cessation education was provided by me to the patient including assessing the patient's readiness for change, identifying barriers to change, suggesting specific actions for change, motivational counseling, and recommending PCP follow-up.        Diagnosis:   1. RSV (respiratory syncytial virus infection)    2. COPD exacerbation (Multi)    3. Elevated troponin    4. Left against medical advice                    Procedure  Procedures     Ankush MARSHALL MD  01/15/25 1102

## 2025-01-17 ENCOUNTER — APPOINTMENT (OUTPATIENT)
Dept: PRIMARY CARE | Facility: CLINIC | Age: 55
End: 2025-01-17
Payer: COMMERCIAL

## 2025-01-17 VITALS
BODY MASS INDEX: 31.5 KG/M2 | TEMPERATURE: 96.9 F | WEIGHT: 220 LBS | HEART RATE: 75 BPM | SYSTOLIC BLOOD PRESSURE: 124 MMHG | HEIGHT: 70 IN | OXYGEN SATURATION: 95 % | DIASTOLIC BLOOD PRESSURE: 82 MMHG

## 2025-01-17 DIAGNOSIS — J44.1 COPD EXACERBATION (MULTI): ICD-10-CM

## 2025-01-17 DIAGNOSIS — I47.20 VENTRICULAR TACHYCARDIA, UNSPECIFIED (MULTI): ICD-10-CM

## 2025-01-17 DIAGNOSIS — E03.9 ACQUIRED HYPOTHYROIDISM: Primary | ICD-10-CM

## 2025-01-17 DIAGNOSIS — I42.8 OTHER CARDIOMYOPATHY: ICD-10-CM

## 2025-01-17 DIAGNOSIS — E78.2 MIXED HYPERLIPIDEMIA: ICD-10-CM

## 2025-01-17 DIAGNOSIS — B33.8 RSV (RESPIRATORY SYNCYTIAL VIRUS INFECTION): ICD-10-CM

## 2025-01-17 LAB — TSH SERPL-ACNC: 0.45 MIU/L (ref 0.44–3.98)

## 2025-01-17 PROCEDURE — 3074F SYST BP LT 130 MM HG: CPT | Performed by: INTERNAL MEDICINE

## 2025-01-17 PROCEDURE — 99214 OFFICE O/P EST MOD 30 MIN: CPT | Performed by: INTERNAL MEDICINE

## 2025-01-17 PROCEDURE — 3079F DIAST BP 80-89 MM HG: CPT | Performed by: INTERNAL MEDICINE

## 2025-01-17 PROCEDURE — 3008F BODY MASS INDEX DOCD: CPT | Performed by: INTERNAL MEDICINE

## 2025-01-17 PROCEDURE — 1036F TOBACCO NON-USER: CPT | Performed by: INTERNAL MEDICINE

## 2025-01-17 RX ORDER — ATORVASTATIN CALCIUM 80 MG/1
80 TABLET, FILM COATED ORAL NIGHTLY
Qty: 90 TABLET | Refills: 3 | Status: SHIPPED | OUTPATIENT
Start: 2025-01-17

## 2025-01-17 RX ORDER — AZITHROMYCIN 250 MG/1
TABLET, FILM COATED ORAL
Qty: 6 TABLET | Refills: 0 | Status: SHIPPED | OUTPATIENT
Start: 2025-01-17 | End: 2025-01-22

## 2025-01-17 RX ORDER — ALBUTEROL SULFATE 90 UG/1
1-2 INHALANT RESPIRATORY (INHALATION) EVERY 6 HOURS PRN
Qty: 18 G | Refills: 11 | Status: SHIPPED | OUTPATIENT
Start: 2025-01-17 | End: 2025-02-16

## 2025-01-17 RX ORDER — BENZONATATE 200 MG/1
200 CAPSULE ORAL 3 TIMES DAILY PRN
Qty: 42 CAPSULE | Refills: 0 | Status: SHIPPED | OUTPATIENT
Start: 2025-01-17 | End: 2025-02-16

## 2025-01-17 RX ORDER — PREDNISONE 20 MG/1
40 TABLET ORAL DAILY
Qty: 10 TABLET | Refills: 0 | Status: SHIPPED | OUTPATIENT
Start: 2025-01-17 | End: 2025-01-22

## 2025-01-17 RX ORDER — AZELASTINE 1 MG/ML
1 SPRAY, METERED NASAL 2 TIMES DAILY
Qty: 30 ML | Refills: 1 | Status: SHIPPED | OUTPATIENT
Start: 2025-01-17 | End: 2026-01-17

## 2025-01-17 ASSESSMENT — ENCOUNTER SYMPTOMS
MUSCULOSKELETAL NEGATIVE: 1
GASTROINTESTINAL NEGATIVE: 1
DEPRESSION: 0
DIZZINESS: 0
APPETITE CHANGE: 1
CHILLS: 1
OCCASIONAL FEELINGS OF UNSTEADINESS: 0
LOSS OF SENSATION IN FEET: 0
CARDIOVASCULAR NEGATIVE: 1
FATIGUE: 1
COUGH: 1

## 2025-01-17 ASSESSMENT — PATIENT HEALTH QUESTIONNAIRE - PHQ9
1. LITTLE INTEREST OR PLEASURE IN DOING THINGS: NOT AT ALL
SUM OF ALL RESPONSES TO PHQ9 QUESTIONS 1 AND 2: 0
2. FEELING DOWN, DEPRESSED OR HOPELESS: NOT AT ALL

## 2025-01-17 NOTE — PROGRESS NOTES
Subjective   Patient ID: Hira Mojica is a 54 y.o. male who presents for Follow-up (3 mo and er fu).  Other  This is a new (RSV) problem. The current episode started in the past 7 days. The problem occurs constantly. The problem has been unchanged. Associated symptoms include chills, congestion, coughing and fatigue. The treatment provided no relief.     Heart Problem  This is a chronic problem. The current episode started more than 1 year ago. The problem occurs rarely. The problem has been resolved. Associated symptoms include arthralgias. Pertinent negatives include no abdominal pain, anorexia, chest pain, diaphoresis, fatigue or weakness. The treatment provided significant relief. Had PCI  Congestive Heart Failure  Presents for follow-up visit. Pertinent negatives include no abdominal pain, chest pain, chest pressure, edema, fatigue or shortness of breath. The symptoms have been improving. Compliance with total regimen is %. Compliance problems include adherence to diet.  Has ICD  Hypertension  This is a chronic problem. The current episode started more than 1 year ago. The problem is unchanged. The problem is uncontrolled. Pertinent negatives include no anxiety, chest pain or shortness of breath. The current treatment provides mild improvement. Hypertensive end-organ damage includes CAD/MI. There is no history of angina. Identifiable causes of hypertension include sleep apnea and a thyroid problem.   Past Medical History  Past Medical History:   Diagnosis Date    COPD (chronic obstructive pulmonary disease) (Multi)     Hyperlipidemia        Social History  Social History     Tobacco Use    Smoking status: Former     Current packs/day: 0.00     Average packs/day: 3.0 packs/day for 20.0 years (60.0 ttl pk-yrs)     Types: Cigarettes     Start date:      Quit date:      Years since quittin.0    Smokeless tobacco: Never   Vaping Use    Vaping status: Never Used   Substance Use Topics     Alcohol use: Not Currently    Drug use: Never       Family History     Family History   Problem Relation Name Age of Onset    Other (arteriosclerotic CVD) Mother Telma     Other (cardiac disorder) Mother Telma     Stroke Mother Telma     Cancer Mother Telma     Colon cancer Mother Telma         had colonectomy which seemed to stop the spread    Abnormal EKG Mother Telma     Angina Mother Telma     Arrhythmia Mother Telma     Atrial fibrillation Mother Telma     Diabetes type II Mother Telma     Heart attack Mother Telma     Heart failure Mother Telma     Hyperlipidemia Mother Telma     Hypertension Mother Telma     Thyroid disease Mother Telma     Other (arteriosclerotic CVD) Father DARCI     Stroke Father DARCI     Cancer Father DARCI     Asthma Father DARCI     Hypertension Father DARCI     Lung disease Father DARCI     Other (arteriosclerotic CVD) Brother russ     Heart attack Brother russ     Heart disease Brother russ     Obesity Brother russ     Diabetes Brother Yaron     Thyroid disease Mother's Sister reeys     Other (cardiac disorder) Other grandmother     Colon cancer Other grandmother         was too late for any remedies and passed away within a month of dx    Cancer Other aunt        Allergies:  Allergies   Allergen Reactions    Penicillins Hives    Ketorolac Other     Adverse reaction        Outpatient Medications:  Current Outpatient Medications   Medication Instructions    albuterol 90 mcg/actuation inhaler 1-2 puffs, inhalation, 4 times daily    albuterol 90 mcg/actuation inhaler 1-2 puffs, inhalation, Every 6 hours PRN    aspirin 81 mg EC tablet 1 tablet, oral, Daily    atorvastatin (LIPITOR) 80 mg, oral, Nightly    azelastine (Astelin) 137 mcg (0.1 %) nasal spray 1 spray, Each Nostril, 2 times daily, Use in each nostril as directed    budesonide-formoteroL (Symbicort) 160-4.5 mcg/actuation inhaler 2 puffs, inhalation, 2 times daily, RINSE MOUTH AFTER USE.    carvedilol (COREG) 25 mg, oral, 2 times daily    clopidogrel  "(PLAVIX) 75 mg, oral, Daily    hydrALAZINE (APRESOLINE) 50 mg, oral, 2 times daily    isosorbide mononitrate ER (Imdur) 30 mg 24 hr tablet 1 tablet, oral, Daily, In morning    levothyroxine (SYNTHROID, LEVOXYL) 200 mcg, oral, Daily    liothyronine (CYTOMEL) 25 mcg, oral, Daily    magnesium oxide 400 mg, oral, Daily    nitroglycerin (NITROSTAT) 0.4 mg, sublingual, Every 5 min PRN, PLACE 1 TABLET UNDER THE TONGUE EVERY 5 MINUTES FOR UP TO 3 DOSES AS NEEDED FOR CHEST PAIN.CALL 911 IF PAIN PERSISTS.    predniSONE (DELTASONE) 60 mg, oral, Daily    torsemide (DEMADEX) 20 mg, oral, Daily    valsartan (DIOVAN) 160 mg, oral, Daily        Review of Systems   Constitutional:  Positive for appetite change, chills and fatigue.   HENT:  Positive for congestion.    Respiratory:  Positive for cough.    Cardiovascular: Negative.    Gastrointestinal: Negative.    Musculoskeletal: Negative.    Neurological:  Negative for dizziness.         Objective       Physical Exam  Vitals reviewed.   Constitutional:       Appearance: Normal appearance. He is obese.   HENT:      Head: Normocephalic.   Eyes:      Conjunctiva/sclera: Conjunctivae normal.   Cardiovascular:      Rate and Rhythm: Normal rate and regular rhythm.      Pulses: Normal pulses.   Pulmonary:      Effort: Pulmonary effort is normal.      Breath sounds: Normal breath sounds.   Abdominal:      Palpations: Abdomen is soft.   Musculoskeletal:         General: No tenderness.      Cervical back: Neck supple.   Skin:     General: Skin is warm and dry.     /82 (BP Location: Left arm, Patient Position: Sitting, BP Cuff Size: Adult)   Pulse 75   Temp 36.1 °C (96.9 °F) (Temporal)   Ht 1.778 m (5' 10\")   Wt 99.8 kg (220 lb)   SpO2 95%   BMI 31.57 kg/m²      Assessment/Plan   Problem List Items Addressed This Visit       Hyperlipidemia     Other Visit Diagnoses       RSV (respiratory syncytial virus infection)        COPD exacerbation (Multi)        Other cardiomyopathy        " Ventricular tachycardia, unspecified (Multi)            He came here after recent ER visit, he was not very happy with the ER visit so we went through all the report and information, it was RSV infection, troponin was very slightly high, it was not suggestive of any myocardial problem, no prescriptions were given for RSV and usually RSV we do not give any specific therapy but he is having profound cough, his chest x-ray was reviewed, last time his TSH was high, we have added Cytomel.  He has a mixed hyperlipidemia last time LDL was 180 he was not taking statin as he is back on statins he has history of CAD, he has history of cardiomyopathy, he has history of ventricular irritability.  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  I gave him prednisone, azithromycin and Tessalon because of profound cough, RSV infection will subside, there is no clinical suspicion for any pneumonia.  He will be seen in 3 months for follow-up visit, continue to have a back pain and he continue to use his CPAP.

## 2025-01-23 ENCOUNTER — APPOINTMENT (OUTPATIENT)
Dept: CARDIOLOGY | Facility: CLINIC | Age: 55
End: 2025-01-23
Payer: COMMERCIAL

## 2025-01-23 VITALS
BODY MASS INDEX: 30.29 KG/M2 | SYSTOLIC BLOOD PRESSURE: 142 MMHG | DIASTOLIC BLOOD PRESSURE: 90 MMHG | HEIGHT: 70 IN | WEIGHT: 211.6 LBS | HEART RATE: 78 BPM

## 2025-01-23 DIAGNOSIS — I25.10 CAD S/P PERCUTANEOUS CORONARY ANGIOPLASTY: ICD-10-CM

## 2025-01-23 DIAGNOSIS — I47.29 PAROXYSMAL VENTRICULAR TACHYCARDIA (MULTI): ICD-10-CM

## 2025-01-23 DIAGNOSIS — I42.0 ISCHEMIC CONGESTIVE CARDIOMYOPATHY (MULTI): ICD-10-CM

## 2025-01-23 DIAGNOSIS — I10 ESSENTIAL HYPERTENSION: ICD-10-CM

## 2025-01-23 DIAGNOSIS — I25.10 ARTERIOSCLEROSIS OF CORONARY ARTERY: ICD-10-CM

## 2025-01-23 DIAGNOSIS — K21.9 GASTROESOPHAGEAL REFLUX DISEASE WITHOUT ESOPHAGITIS: ICD-10-CM

## 2025-01-23 DIAGNOSIS — E78.2 MIXED HYPERLIPIDEMIA: ICD-10-CM

## 2025-01-23 DIAGNOSIS — I71.21 ANEURYSM OF ASCENDING AORTA WITHOUT RUPTURE (CMS-HCC): ICD-10-CM

## 2025-01-23 DIAGNOSIS — Z87.891 FORMER CIGARETTE SMOKER: ICD-10-CM

## 2025-01-23 DIAGNOSIS — Z98.61 POST PTCA: ICD-10-CM

## 2025-01-23 DIAGNOSIS — J43.2 CENTRILOBULAR EMPHYSEMA (MULTI): ICD-10-CM

## 2025-01-23 DIAGNOSIS — Z98.61 CAD S/P PERCUTANEOUS CORONARY ANGIOPLASTY: ICD-10-CM

## 2025-01-23 DIAGNOSIS — G47.33 OBSTRUCTIVE SLEEP APNEA, ADULT: ICD-10-CM

## 2025-01-23 DIAGNOSIS — I25.5 ISCHEMIC CONGESTIVE CARDIOMYOPATHY (MULTI): ICD-10-CM

## 2025-01-23 DIAGNOSIS — Z95.810 CARDIAC DEFIBRILLATOR IN PLACE: ICD-10-CM

## 2025-01-23 PROCEDURE — 3008F BODY MASS INDEX DOCD: CPT | Performed by: INTERNAL MEDICINE

## 2025-01-23 PROCEDURE — 99213 OFFICE O/P EST LOW 20 MIN: CPT | Performed by: INTERNAL MEDICINE

## 2025-01-23 PROCEDURE — 1036F TOBACCO NON-USER: CPT | Performed by: INTERNAL MEDICINE

## 2025-01-23 PROCEDURE — 3080F DIAST BP >= 90 MM HG: CPT | Performed by: INTERNAL MEDICINE

## 2025-01-23 PROCEDURE — 3077F SYST BP >= 140 MM HG: CPT | Performed by: INTERNAL MEDICINE

## 2025-01-23 RX ORDER — CLOPIDOGREL BISULFATE 75 MG/1
75 TABLET ORAL DAILY
Qty: 90 TABLET | Refills: 3 | Status: SHIPPED | OUTPATIENT
Start: 2025-01-23 | End: 2026-01-23

## 2025-01-23 RX ORDER — ISOSORBIDE MONONITRATE 30 MG/1
30 TABLET, EXTENDED RELEASE ORAL DAILY
Qty: 90 TABLET | Refills: 3 | Status: SHIPPED | OUTPATIENT
Start: 2025-01-23 | End: 2026-01-23

## 2025-01-23 NOTE — PATIENT INSTRUCTIONS
Patient to follow up in 9 months with Dr. Esthela Trimble MD [facility]     No changes today.   Continue same medications and treatments.   Patient educated on proper medication use.   Patient educated on risk factor modification.   Please bring any lab results from other providers / physicians to your next appointment.     Please bring all medicines, vitamins, and herbal supplements with you when you come to the office.     Prescriptions will not be filled unless you are compliant with your follow up appointments or have a follow up appointment scheduled as per instruction of your physician. Refills should be requested at the time of your visit.    Bill JIMÉNEZ RN am scribing for and in the presence of Dr. Esthela Trimble MD [facility]

## 2025-01-23 NOTE — PROGRESS NOTES
Referred by Dr. Bauman ref. provider found provider found for   Chief Complaint   Patient presents with    Follow-up     6 month        History of Present Illness  Hira Mojica is a 54 y.o. year old male patient here for follow-up.  The blood pressure is adequately controlled.  Complaining of ear ache most likely related to the lower respiratory tract infection.  Did not have any symptoms of chest pain palpitations syncope or presyncope.  Discussed with the patient discontinue medication with Tylenol follow-up as scheduled    Past Medical History  Past Medical History:   Diagnosis Date    COPD (chronic obstructive pulmonary disease) (Multi)     Hyperlipidemia        Social History  Social History     Tobacco Use    Smoking status: Former     Current packs/day: 0.00     Average packs/day: 3.0 packs/day for 20.0 years (60.0 ttl pk-yrs)     Types: Cigarettes     Start date:      Quit date:      Years since quittin.0    Smokeless tobacco: Never   Vaping Use    Vaping status: Never Used   Substance Use Topics    Alcohol use: Not Currently    Drug use: Never       Family History     Family History   Problem Relation Name Age of Onset    Other (arteriosclerotic CVD) Mother Telma     Other (cardiac disorder) Mother Telma     Stroke Mother Telma     Cancer Mother Telma     Colon cancer Mother Telma         had colonectomy which seemed to stop the spread    Abnormal EKG Mother Telma     Angina Mother Telma     Arrhythmia Mother Telma     Atrial fibrillation Mother Telma     Diabetes type II Mother Telma     Heart attack Mother Telma     Heart failure Mother Telma     Hyperlipidemia Mother Telma     Hypertension Mother Telma     Thyroid disease Mother Telma     Other (arteriosclerotic CVD) Father DARCI     Stroke Father DARCI     Cancer Father DARCI     Asthma Father DARCI     Hypertension Father DARCI     Lung disease Father DARCI     Other (arteriosclerotic CVD) Brother russ     Heart attack Brother russ     Heart disease Brother  russ     Obesity Brother russ     Diabetes Brother Yaron     Thyroid disease Mother's Sister reyes     Other (cardiac disorder) Other grandmother     Colon cancer Other grandmother         was too late for any remedies and passed away within a month of dx    Cancer Other aunt        Review of Systems  As per HPI, all other systems reviewed and negative.    Allergies:  Allergies   Allergen Reactions    Penicillins Hives    Ketorolac Other     Adverse reaction        Outpatient Medications:  Current Outpatient Medications   Medication Instructions    albuterol 90 mcg/actuation inhaler 1-2 puffs, inhalation, 4 times daily    albuterol 90 mcg/actuation inhaler 1-2 puffs, inhalation, Every 6 hours PRN    aspirin 81 mg EC tablet 1 tablet, Daily    atorvastatin (LIPITOR) 80 mg, oral, Nightly    azelastine (Astelin) 137 mcg (0.1 %) nasal spray 1 spray, Each Nostril, 2 times daily, Use in each nostril as directed    benzonatate (TESSALON) 200 mg, oral, 3 times daily PRN, Do not crush or chew.    budesonide-formoteroL (Symbicort) 160-4.5 mcg/actuation inhaler 2 puffs, inhalation, 2 times daily, RINSE MOUTH AFTER USE.    carvedilol (COREG) 25 mg, oral, 2 times daily    clopidogrel (PLAVIX) 75 mg, oral, Daily    hydrALAZINE (APRESOLINE) 50 mg, oral, 2 times daily    isosorbide mononitrate ER (Imdur) 30 mg 24 hr tablet 1 tablet, Daily    levothyroxine (SYNTHROID, LEVOXYL) 200 mcg, oral, Daily    liothyronine (CYTOMEL) 25 mcg, oral, Daily    magnesium oxide 400 mg, oral, Daily    nitroglycerin (NITROSTAT) 0.4 mg, sublingual, Every 5 min PRN, PLACE 1 TABLET UNDER THE TONGUE EVERY 5 MINUTES FOR UP TO 3 DOSES AS NEEDED FOR CHEST PAIN.CALL 911 IF PAIN PERSISTS.    torsemide (DEMADEX) 20 mg, oral, Daily    valsartan (DIOVAN) 160 mg, oral, Daily         Vitals:  Vitals:    01/23/25 0928   BP: 142/90   Pulse: 78       Physical Exam:  Physical Exam  Constitutional:       Appearance: Normal appearance.   HENT:      Head: Normocephalic  and atraumatic.   Eyes:      Extraocular Movements: Extraocular movements intact.      Pupils: Pupils are equal, round, and reactive to light.   Cardiovascular:      Rate and Rhythm: Normal rate and regular rhythm.      Pulses: Normal pulses.      Heart sounds: Normal heart sounds.   Pulmonary:      Effort: Pulmonary effort is normal.      Breath sounds: Normal breath sounds.   Abdominal:      General: Abdomen is flat.      Palpations: Abdomen is soft.   Musculoskeletal:      Right lower leg: No edema.      Left lower leg: No edema.   Skin:     General: Skin is warm and dry.   Neurological:      General: No focal deficit present.      Mental Status: He is alert and oriented to person, place, and time.             Assessment/Plan   Diagnoses and all orders for this visit:  CAD S/P percutaneous coronary angioplasty  Essential hypertension  Mixed hyperlipidemia  Ischemic congestive cardiomyopathy (Multi)  Paroxysmal ventricular tachycardia (Multi)  Aneurysm of ascending aorta without rupture (CMS-[facility])  Cardiac defibrillator in place  BMI 30.0-30.9,adult  Gastroesophageal reflux disease without esophagitis  Centrilobular emphysema (Multi)  Obstructive sleep apnea, adult  Former cigarette smoker          Esthela Trimble MD Merged with [facility]  Interventional Cardiology   of [facility]     Thank you for allowing me to participate in the care of this patient. Please do not hesitate to contact me with any further questions or concerns.

## 2025-02-09 LAB
ATRIAL RATE: 78 BPM
ATRIAL RATE: 83 BPM
P AXIS: 40 DEGREES
P AXIS: 41 DEGREES
P OFFSET: 196 MS
P OFFSET: 197 MS
P ONSET: 136 MS
P ONSET: 138 MS
PR INTERVAL: 152 MS
PR INTERVAL: 162 MS
Q ONSET: 214 MS
Q ONSET: 217 MS
QRS COUNT: 13 BEATS
QRS COUNT: 14 BEATS
QRS DURATION: 94 MS
QRS DURATION: 98 MS
QT INTERVAL: 388 MS
QT INTERVAL: 414 MS
QTC CALCULATION(BAZETT): 442 MS
QTC CALCULATION(BAZETT): 486 MS
QTC FREDERICIA: 423 MS
QTC FREDERICIA: 461 MS
R AXIS: 56 DEGREES
R AXIS: 58 DEGREES
T AXIS: 34 DEGREES
T AXIS: 44 DEGREES
T OFFSET: 411 MS
T OFFSET: 421 MS
VENTRICULAR RATE: 78 BPM
VENTRICULAR RATE: 83 BPM

## 2025-02-12 ENCOUNTER — OFFICE VISIT (OUTPATIENT)
Dept: PRIMARY CARE | Facility: CLINIC | Age: 55
End: 2025-02-12
Payer: COMMERCIAL

## 2025-02-12 VITALS
WEIGHT: 207 LBS | TEMPERATURE: 96.9 F | SYSTOLIC BLOOD PRESSURE: 138 MMHG | DIASTOLIC BLOOD PRESSURE: 79 MMHG | BODY MASS INDEX: 29.63 KG/M2 | HEART RATE: 93 BPM | HEIGHT: 70 IN

## 2025-02-12 DIAGNOSIS — J02.9 PHARYNGITIS, UNSPECIFIED ETIOLOGY: Primary | ICD-10-CM

## 2025-02-12 DIAGNOSIS — B34.9 VIRAL INFECTION: ICD-10-CM

## 2025-02-12 PROCEDURE — 3075F SYST BP GE 130 - 139MM HG: CPT | Performed by: INTERNAL MEDICINE

## 2025-02-12 PROCEDURE — 3008F BODY MASS INDEX DOCD: CPT | Performed by: INTERNAL MEDICINE

## 2025-02-12 PROCEDURE — 3078F DIAST BP <80 MM HG: CPT | Performed by: INTERNAL MEDICINE

## 2025-02-12 PROCEDURE — 1036F TOBACCO NON-USER: CPT | Performed by: INTERNAL MEDICINE

## 2025-02-12 PROCEDURE — 99213 OFFICE O/P EST LOW 20 MIN: CPT | Performed by: INTERNAL MEDICINE

## 2025-02-12 RX ORDER — AZITHROMYCIN 250 MG/1
TABLET, FILM COATED ORAL
Qty: 6 TABLET | Refills: 0 | Status: SHIPPED | OUTPATIENT
Start: 2025-02-12 | End: 2025-02-17

## 2025-02-12 RX ORDER — OSELTAMIVIR PHOSPHATE 75 MG/1
75 CAPSULE ORAL 2 TIMES DAILY
Qty: 10 CAPSULE | Refills: 0 | Status: SHIPPED | OUTPATIENT
Start: 2025-02-12 | End: 2025-02-17

## 2025-02-12 ASSESSMENT — ENCOUNTER SYMPTOMS
TROUBLE SWALLOWING: 0
FATIGUE: 1
DIZZINESS: 0
LOSS OF SENSATION IN FEET: 0
ARTHRALGIAS: 1
FEVER: 1
OCCASIONAL FEELINGS OF UNSTEADINESS: 0
DEPRESSION: 0
SORE THROAT: 1
VOMITING: 0
HEADACHES: 1
CARDIOVASCULAR NEGATIVE: 1
COUGH: 1
SWOLLEN GLANDS: 0
GASTROINTESTINAL NEGATIVE: 1
HOARSE VOICE: 0

## 2025-02-12 ASSESSMENT — PATIENT HEALTH QUESTIONNAIRE - PHQ9
1. LITTLE INTEREST OR PLEASURE IN DOING THINGS: NOT AT ALL
2. FEELING DOWN, DEPRESSED OR HOPELESS: NOT AT ALL
SUM OF ALL RESPONSES TO PHQ9 QUESTIONS 1 AND 2: 0

## 2025-02-12 NOTE — PROGRESS NOTES
Subjective   Patient ID: Hira Mojica is a 54 y.o. male who presents for Sore Throat (Sore throat, cough, congestion, fever and body aches, exposed to covid,flu and strep).  Sore Throat   This is a new problem. The current episode started in the past 7 days. The problem has been unchanged. The maximum temperature recorded prior to his arrival was 100.4 - 100.9 F. The fever has been present for 1 to 2 days. The pain is mild. Associated symptoms include congestion, coughing and headaches. Pertinent negatives include no drooling, hoarse voice, plugged ear sensation, swollen glands, trouble swallowing or vomiting. He has had exposure to strep. The treatment provided no relief.       Past Medical History  Past Medical History:   Diagnosis Date    COPD (chronic obstructive pulmonary disease) (Multi)     Hyperlipidemia        Social History  Social History     Tobacco Use    Smoking status: Former     Current packs/day: 0.00     Average packs/day: 3.0 packs/day for 20.0 years (60.0 ttl pk-yrs)     Types: Cigarettes     Start date:      Quit date:      Years since quittin.1    Smokeless tobacco: Never   Vaping Use    Vaping status: Never Used   Substance Use Topics    Alcohol use: Not Currently    Drug use: Never       Family History     Family History   Problem Relation Name Age of Onset    Other (arteriosclerotic CVD) Mother Telma     Other (cardiac disorder) Mother Telma     Stroke Mother Telma     Cancer Mother Telma     Colon cancer Mother Telma         had colonectomy which seemed to stop the spread    Abnormal EKG Mother Telma     Angina Mother Telma     Arrhythmia Mother Telma     Atrial fibrillation Mother Telma     Diabetes type II Mother Telma     Heart attack Mother Telma     Heart failure Mother Telma     Hyperlipidemia Mother Telma     Hypertension Mother Telma     Thyroid disease Mother Telma     Other (arteriosclerotic CVD) Father DARCI     Stroke Father DARCI     Cancer Father DARCI     Asthma Father DARCI      Hypertension Father DARCI     Lung disease Father DARCI     Other (arteriosclerotic CVD) Brother russ     Heart attack Brother russ     Heart disease Brother russ     Obesity Brother russ     Diabetes Brother Yaron     Thyroid disease Mother's Sister reyes     Other (cardiac disorder) Other grandmother     Colon cancer Other grandmother         was too late for any remedies and passed away within a month of dx    Cancer Other aunt        Allergies:  Allergies   Allergen Reactions    Penicillins Hives    Ketorolac Other     Adverse reaction        Outpatient Medications:  Current Outpatient Medications   Medication Instructions    albuterol 90 mcg/actuation inhaler 1-2 puffs, inhalation, 4 times daily    albuterol 90 mcg/actuation inhaler 1-2 puffs, inhalation, Every 6 hours PRN    aspirin 81 mg EC tablet 1 tablet, Daily    atorvastatin (LIPITOR) 80 mg, oral, Nightly    azelastine (Astelin) 137 mcg (0.1 %) nasal spray 1 spray, Each Nostril, 2 times daily, Use in each nostril as directed    benzonatate (TESSALON) 200 mg, oral, 3 times daily PRN, Do not crush or chew.    budesonide-formoteroL (Symbicort) 160-4.5 mcg/actuation inhaler 2 puffs, inhalation, 2 times daily, RINSE MOUTH AFTER USE.    carvedilol (COREG) 25 mg, oral, 2 times daily    clopidogrel (PLAVIX) 75 mg, oral, Daily    hydrALAZINE (APRESOLINE) 50 mg, oral, 2 times daily    isosorbide mononitrate ER (IMDUR) 30 mg, oral, Daily, In morning    levothyroxine (SYNTHROID, LEVOXYL) 200 mcg, oral, Daily    liothyronine (CYTOMEL) 25 mcg, oral, Daily    magnesium oxide 400 mg, oral, Daily    nitroglycerin (NITROSTAT) 0.4 mg, sublingual, Every 5 min PRN, PLACE 1 TABLET UNDER THE TONGUE EVERY 5 MINUTES FOR UP TO 3 DOSES AS NEEDED FOR CHEST PAIN.CALL 911 IF PAIN PERSISTS.    torsemide (DEMADEX) 20 mg, oral, Daily    valsartan (DIOVAN) 160 mg, oral, Daily        Review of Systems   Constitutional:  Positive for fatigue and fever.   HENT:  Positive for congestion and  "sore throat. Negative for drooling, hoarse voice and trouble swallowing.    Respiratory:  Positive for cough.    Cardiovascular: Negative.    Gastrointestinal: Negative.  Negative for vomiting.   Musculoskeletal:  Positive for arthralgias.   Neurological:  Positive for headaches. Negative for dizziness.         Objective       Physical Exam  Vitals reviewed.   Constitutional:       Appearance: Normal appearance. He is overweight.   HENT:      Head: Normocephalic.      Mouth/Throat:      Pharynx: Posterior oropharyngeal erythema present. No oropharyngeal exudate.   Eyes:      Conjunctiva/sclera: Conjunctivae normal.   Cardiovascular:      Rate and Rhythm: Normal rate and regular rhythm.      Pulses: Normal pulses.   Pulmonary:      Effort: Pulmonary effort is normal.      Breath sounds: Normal breath sounds.   Abdominal:      Palpations: Abdomen is soft.   Musculoskeletal:         General: Normal range of motion.      Cervical back: Neck supple.   Skin:     General: Skin is warm and dry.   Neurological:      General: No focal deficit present.       /79 (BP Location: Left arm, Patient Position: Sitting, BP Cuff Size: Adult)   Pulse 93   Temp 36.1 °C (96.9 °F) (Temporal)   Ht 1.778 m (5' 10\")   Wt 93.9 kg (207 lb)   BMI 29.70 kg/m²      Assessment/Plan   Problem List Items Addressed This Visit    None  Visit Diagnoses       Pharyngitis, unspecified etiology    -  Primary    Viral infection            He called and came because he has been exposed to streptococcal infection, his grandchildren has a streptococcal infection and influenza both, he has started feeling cold, cough, congestion, stuffiness, sore throat, chills.  I do not see any exudate on exam, can presume that it is a streptococcal pharyngitis to be coming up, cannot rule out influenza, smears for virus were taken, azithromycin to be started from today, oseltamavir is given provided influenza A is positive, we will notify him tomorrow if influenza " or RSV or COVID are positive and appropriate treatments will be given and oseltamivir is already given so he can get started.  He cannot go to work till next Monday.

## 2025-02-17 ENCOUNTER — HOSPITAL ENCOUNTER (OUTPATIENT)
Dept: CARDIOLOGY | Facility: HOSPITAL | Age: 55
Discharge: HOME | End: 2025-02-17
Payer: COMMERCIAL

## 2025-02-17 DIAGNOSIS — Z95.810 PRESENCE OF AUTOMATIC CARDIOVERTER/DEFIBRILLATOR (AICD): ICD-10-CM

## 2025-02-17 DIAGNOSIS — I47.29 VENTRICULAR TACHYCARDIA (PAROXYSMAL) (MULTI): ICD-10-CM

## 2025-02-17 LAB
FLUAV RNA RESP QL NAA+PROBE: NOT DETECTED
FLUBV RNA RESP QL NAA+PROBE: NOT DETECTED
QUEST CLIENT EDUCATION TRACKING: NORMAL
QUEST TRACKING HOUSE ACCOUNT: NORMAL
RSV RNA RESP QL NAA+PROBE: NOT DETECTED
SARS-COV-2 RNA RESP QL NAA+PROBE: DETECTED

## 2025-03-18 ENCOUNTER — HOSPITAL ENCOUNTER (OUTPATIENT)
Dept: CARDIOLOGY | Facility: HOSPITAL | Age: 55
Discharge: HOME | End: 2025-03-18
Payer: COMMERCIAL

## 2025-03-18 DIAGNOSIS — I47.29 VENTRICULAR TACHYCARDIA (PAROXYSMAL) (MULTI): ICD-10-CM

## 2025-03-18 DIAGNOSIS — Z95.810 PRESENCE OF AUTOMATIC CARDIOVERTER/DEFIBRILLATOR (AICD): ICD-10-CM

## 2025-03-18 PROCEDURE — 93295 DEV INTERROG REMOTE 1/2/MLT: CPT | Performed by: INTERNAL MEDICINE

## 2025-03-18 PROCEDURE — 93296 REM INTERROG EVL PM/IDS: CPT

## 2025-04-04 ENCOUNTER — OFFICE VISIT (OUTPATIENT)
Dept: PRIMARY CARE | Facility: CLINIC | Age: 55
End: 2025-04-04
Payer: COMMERCIAL

## 2025-04-04 VITALS
HEART RATE: 77 BPM | DIASTOLIC BLOOD PRESSURE: 78 MMHG | BODY MASS INDEX: 31.5 KG/M2 | HEIGHT: 70 IN | TEMPERATURE: 95.9 F | WEIGHT: 220 LBS | SYSTOLIC BLOOD PRESSURE: 120 MMHG

## 2025-04-04 DIAGNOSIS — K40.91 UNILATERAL RECURRENT INGUINAL HERNIA WITHOUT OBSTRUCTION OR GANGRENE: Primary | ICD-10-CM

## 2025-04-04 DIAGNOSIS — E78.2 MIXED HYPERLIPIDEMIA: ICD-10-CM

## 2025-04-04 DIAGNOSIS — G89.11 ACUTE PAIN OF LEFT SHOULDER DUE TO TRAUMA: ICD-10-CM

## 2025-04-04 DIAGNOSIS — M25.512 ACUTE PAIN OF LEFT SHOULDER DUE TO TRAUMA: ICD-10-CM

## 2025-04-04 PROCEDURE — 3008F BODY MASS INDEX DOCD: CPT | Performed by: INTERNAL MEDICINE

## 2025-04-04 PROCEDURE — 3078F DIAST BP <80 MM HG: CPT | Performed by: INTERNAL MEDICINE

## 2025-04-04 PROCEDURE — 1036F TOBACCO NON-USER: CPT | Performed by: INTERNAL MEDICINE

## 2025-04-04 PROCEDURE — 99213 OFFICE O/P EST LOW 20 MIN: CPT | Performed by: INTERNAL MEDICINE

## 2025-04-04 PROCEDURE — 3074F SYST BP LT 130 MM HG: CPT | Performed by: INTERNAL MEDICINE

## 2025-04-04 RX ORDER — PRAVASTATIN SODIUM 40 MG/1
40 TABLET ORAL DAILY
Qty: 30 TABLET | Refills: 11 | Status: SHIPPED | OUTPATIENT
Start: 2025-04-04 | End: 2026-04-04

## 2025-04-04 ASSESSMENT — COLUMBIA-SUICIDE SEVERITY RATING SCALE - C-SSRS
2. HAVE YOU ACTUALLY HAD ANY THOUGHTS OF KILLING YOURSELF?: NO
1. IN THE PAST MONTH, HAVE YOU WISHED YOU WERE DEAD OR WISHED YOU COULD GO TO SLEEP AND NOT WAKE UP?: NO
6. HAVE YOU EVER DONE ANYTHING, STARTED TO DO ANYTHING, OR PREPARED TO DO ANYTHING TO END YOUR LIFE?: NO

## 2025-04-04 ASSESSMENT — ENCOUNTER SYMPTOMS
RESPIRATORY NEGATIVE: 1
CARDIOVASCULAR NEGATIVE: 1
OCCASIONAL FEELINGS OF UNSTEADINESS: 0
LOSS OF SENSATION IN FEET: 0
ARTHRALGIAS: 1
DEPRESSION: 0
DIZZINESS: 0
CONSTITUTIONAL NEGATIVE: 1
GASTROINTESTINAL NEGATIVE: 1

## 2025-04-04 NOTE — PROGRESS NOTES
Subjective   Patient ID: Hira Mojica is a 54 y.o. male who presents for Injury (Bent over and hit left shoulder on edge of table and abdominal hernia).  Injury  The incident occurred 3 to 5 days ago. The incident occurred at home. The injury mechanism was a direct blow. The injury occurred in the context of other. The pain is mild. There have been no prior injuries to these areas.   Groin Pain  The patient's pertinent negatives include no scrotal swelling or testicular pain. Primary symptoms comment: He thinks hernia. The problem occurs intermittently. The problem has been waxing and waning. The pain is mild. The symptoms are aggravated by heavy lifting.       Past Medical History  Past Medical History:   Diagnosis Date    COPD (chronic obstructive pulmonary disease) (Multi)     Hyperlipidemia        Social History  Social History     Tobacco Use    Smoking status: Former     Current packs/day: 0.00     Average packs/day: 3.0 packs/day for 20.0 years (60.0 ttl pk-yrs)     Types: Cigarettes     Start date:      Quit date:      Years since quittin.2    Smokeless tobacco: Never   Vaping Use    Vaping status: Never Used   Substance Use Topics    Alcohol use: Not Currently    Drug use: Never       Family History     Family History   Problem Relation Name Age of Onset    Other (arteriosclerotic CVD) Mother Telma     Other (cardiac disorder) Mother Telma     Stroke Mother Telma     Cancer Mother Telma     Colon cancer Mother Telma         had colonectomy which seemed to stop the spread    Abnormal EKG Mother Telma     Angina Mother Telma     Arrhythmia Mother Telma     Atrial fibrillation Mother Telma     Diabetes type II Mother Telma     Heart attack Mother Telma     Heart failure Mother Telma     Hyperlipidemia Mother Telma     Hypertension Mother Telma     Thyroid disease Mother Telma     Other (arteriosclerotic CVD) Father DARCI     Stroke Father DARCI     Cancer Father DARCI     Asthma Father DARCI     Hypertension  Father DARCI     Lung disease Father DARCI     Other (arteriosclerotic CVD) Brother russ     Heart attack Brother russ     Heart disease Brother russ     Obesity Brother russ     Diabetes Brother Yaron     Thyroid disease Mother's Sister reyes     Other (cardiac disorder) Other grandmother     Colon cancer Other grandmother         was too late for any remedies and passed away within a month of dx    Cancer Other aunt        Allergies:  Allergies   Allergen Reactions    Penicillins Hives    Ketorolac Other     Adverse reaction        Outpatient Medications:  Current Outpatient Medications   Medication Instructions    albuterol 90 mcg/actuation inhaler 1-2 puffs, inhalation, 4 times daily    aspirin 81 mg EC tablet 1 tablet, Daily    azelastine (Astelin) 137 mcg (0.1 %) nasal spray 1 spray, Each Nostril, 2 times daily, Use in each nostril as directed    budesonide-formoteroL (Symbicort) 160-4.5 mcg/actuation inhaler 2 puffs, inhalation, 2 times daily, RINSE MOUTH AFTER USE.    carvedilol (COREG) 25 mg, oral, 2 times daily    clopidogrel (PLAVIX) 75 mg, oral, Daily    hydrALAZINE (APRESOLINE) 50 mg, oral, 2 times daily    levothyroxine (SYNTHROID, LEVOXYL) 200 mcg, oral, Daily    liothyronine (CYTOMEL) 25 mcg, oral, Daily    nitroglycerin (NITROSTAT) 0.4 mg, sublingual, Every 5 min PRN, PLACE 1 TABLET UNDER THE TONGUE EVERY 5 MINUTES FOR UP TO 3 DOSES AS NEEDED FOR CHEST PAIN.CALL 911 IF PAIN PERSISTS.    torsemide (DEMADEX) 20 mg, oral, Daily    valsartan (DIOVAN) 160 mg, oral, Daily        Review of Systems   Constitutional: Negative.    Respiratory: Negative.     Cardiovascular: Negative.    Gastrointestinal: Negative.    Genitourinary:  Negative for scrotal swelling and testicular pain.   Musculoskeletal:  Positive for arthralgias.   Neurological:  Negative for dizziness.         Objective       Physical Exam  Vitals reviewed.   Constitutional:       Appearance: Normal appearance. He is normal weight.   HENT:       "Head: Normocephalic.   Eyes:      Conjunctiva/sclera: Conjunctivae normal.   Cardiovascular:      Rate and Rhythm: Normal rate and regular rhythm.      Pulses: Normal pulses.   Pulmonary:      Effort: Pulmonary effort is normal.      Breath sounds: Normal breath sounds.   Abdominal:      Palpations: Abdomen is soft.      Hernia: A hernia is present. Hernia is present in the left inguinal area.   Musculoskeletal:         General: No tenderness or deformity. Normal range of motion.      Cervical back: Neck supple.   Skin:     General: Skin is warm and dry.   Neurological:      General: No focal deficit present.       /78 (BP Location: Right arm, Patient Position: Sitting, BP Cuff Size: Adult)   Pulse 77   Temp 35.5 °C (95.9 °F) (Temporal)   Ht 1.778 m (5' 10\")   Wt 99.8 kg (220 lb)   BMI 31.57 kg/m²      Assessment/Plan   Problem List Items Addressed This Visit       Unilateral recurrent inguinal hernia without obstruction or gangrene - Primary     Other Visit Diagnoses       Acute pain of left shoulder due to trauma            Called and came because he was bending down to lift something from the ground and the pacemaker area got hit at the edge of the table, he has a lot of pain at the site of the injury at the pacemaker battery itself, then he thinks that his hernia is coming back in the left inguinal region.  He has been wearing a truss, today I did not see any impulse on coughing but when he lifts heavy weight he sees the bulge coming out from the left inguinal region.  First of all he needs a statement that he cannot lift more than 10 pounds of the weight while at work and also he needs surgical evaluation.  He is not taking atorvastatin he cannot tolerate he cannot be without statins we will try pravastatin.  Patient was reassured about this injury on the pacemaker area, there is full range of motion, there is no hematoma, there is no displacement to be thought about, this pain should subside, " otherwise he is doing well, he is compliant with CPAP mask he is believes, he will return back in 3 months.

## 2025-04-14 ENCOUNTER — HOSPITAL ENCOUNTER (OUTPATIENT)
Dept: CARDIOLOGY | Facility: HOSPITAL | Age: 55
Discharge: HOME | End: 2025-04-14
Payer: COMMERCIAL

## 2025-04-14 DIAGNOSIS — I47.20 VENTRICULAR TACHYCARDIA (PAROXYSMAL): ICD-10-CM

## 2025-04-14 DIAGNOSIS — Z95.810 PRESENCE OF AUTOMATIC CARDIOVERTER/DEFIBRILLATOR (AICD): ICD-10-CM

## 2025-04-17 ENCOUNTER — APPOINTMENT (OUTPATIENT)
Dept: SURGERY | Facility: CLINIC | Age: 55
End: 2025-04-17
Payer: COMMERCIAL

## 2025-04-17 VITALS
WEIGHT: 218 LBS | BODY MASS INDEX: 31.21 KG/M2 | SYSTOLIC BLOOD PRESSURE: 154 MMHG | DIASTOLIC BLOOD PRESSURE: 92 MMHG | HEIGHT: 70 IN

## 2025-04-17 DIAGNOSIS — R10.32 GROIN PAIN, LEFT: ICD-10-CM

## 2025-04-17 PROCEDURE — 3077F SYST BP >= 140 MM HG: CPT | Performed by: SURGERY

## 2025-04-17 PROCEDURE — 3080F DIAST BP >= 90 MM HG: CPT | Performed by: SURGERY

## 2025-04-17 PROCEDURE — 3008F BODY MASS INDEX DOCD: CPT | Performed by: SURGERY

## 2025-04-17 PROCEDURE — 99204 OFFICE O/P NEW MOD 45 MIN: CPT | Performed by: SURGERY

## 2025-04-17 NOTE — PROGRESS NOTES
Subjective   Patient ID: Hira Mojica is a 55 y.o. male who presents for Inguinal Hernia.  HPI  55-year-old male patient known to me. He is on aspirin and Plavix for cardiac stents.  History of pacemaker.  History of open right inguinal hernia repair with mesh years ago. In 2017, he underwent laparoscopic left inguinal hernia repair with 10 x 15 cm progrip mesh. In 2018, he underwent laparoscopic repair of recurrent right inguinal hernia with 10 x 15 cm ProGrip mesh. In April 2021, he underwent laparoscopic lysis of adhesions and open repair of recurrent right inguinal hernia with 6 x 6 inch Atrium Prolite ultra mesh. He was doing well until 3 wks ago when he was constipated and started having left groin pain. The pain is sharp, intermittent, 8 out of 10 in left groin with no radiation and tends to resolve on it own. He reports intermittent left groin bulge. He is wearing the hernia belt to help reduce the bulge when lifting at work. Denies tobacco use.    Objective   Physical Exam  Constitutional: no acute distress, well appearing and well nourished  Eyes: conjunctiva and lids with no erythema, swelling or discharge; EOMI  Ears, Nose, Mouth and Throat: external inspection of ears and nose are normal  Pulmonary: normal respiratory effort; clear to auscultation bilaterally, no wheezes or bronchi   Cardiovascular: regular rate and rhythm, no murmurs or extra-heart sounds; pedal pulses are normal; no extremities edema or varicosities, no peripheral edema  Abdomen: soft, non-tender, non-distended  Groins: tenderness in left groin but no palpable hernia defect  Musculoskeletal: digits and nails normal without clubbing or cyanosis; Joints, bones and muscles are normal with normal range of motion; muscle strength/tone is normal  Skin: normal without rashes or lesion  Neurologic: cranial nerve II-XII intact grossly; normal gait  Psychiatric: oriented to person, place and time    Assessment/Plan     55-year-old male  patient with history of laparoscopic and open right inguinal hernia repair, laparoscopic left inguinal hernia repair with 10x15 cm Progrip mesh in 2017.  His work involves heavy lifting.  3 weeks history of reported left groin pain and bulge.  No defect appreciated on physical exam.  I will obtain CT pelvis for further evaluation.  Further recommendation after scan is done.  He is on aspirin and Plavix for cardiac stents.  He will continue to wear the hernia belt; all questions answered       Roberto Miranda MD 04/17/25 3:17 PM

## 2025-04-25 ENCOUNTER — APPOINTMENT (OUTPATIENT)
Dept: PRIMARY CARE | Facility: CLINIC | Age: 55
End: 2025-04-25
Payer: COMMERCIAL

## 2025-05-01 ENCOUNTER — APPOINTMENT (OUTPATIENT)
Dept: RADIOLOGY | Facility: HOSPITAL | Age: 55
End: 2025-05-01
Payer: COMMERCIAL

## 2025-05-14 ENCOUNTER — HOSPITAL ENCOUNTER (OUTPATIENT)
Dept: CARDIOLOGY | Facility: HOSPITAL | Age: 55
Discharge: HOME | End: 2025-05-14
Payer: COMMERCIAL

## 2025-05-14 DIAGNOSIS — Z95.810 PRESENCE OF AUTOMATIC CARDIOVERTER/DEFIBRILLATOR (AICD): ICD-10-CM

## 2025-05-14 DIAGNOSIS — I47.20 VENTRICULAR TACHYCARDIA (PAROXYSMAL): ICD-10-CM

## 2025-06-16 ENCOUNTER — HOSPITAL ENCOUNTER (OUTPATIENT)
Dept: CARDIOLOGY | Facility: HOSPITAL | Age: 55
Discharge: HOME | End: 2025-06-16
Payer: COMMERCIAL

## 2025-06-16 DIAGNOSIS — I47.20 VENTRICULAR TACHYCARDIA (PAROXYSMAL): ICD-10-CM

## 2025-06-16 DIAGNOSIS — Z95.810 PRESENCE OF AUTOMATIC CARDIOVERTER/DEFIBRILLATOR (AICD): ICD-10-CM

## 2025-07-11 ENCOUNTER — HOSPITAL ENCOUNTER (OUTPATIENT)
Dept: CARDIOLOGY | Facility: HOSPITAL | Age: 55
Discharge: HOME | End: 2025-07-11
Payer: COMMERCIAL

## 2025-07-11 DIAGNOSIS — Z95.810 PRESENCE OF AUTOMATIC CARDIOVERTER/DEFIBRILLATOR (AICD): ICD-10-CM

## 2025-07-11 DIAGNOSIS — I47.20 VENTRICULAR TACHYCARDIA (PAROXYSMAL): ICD-10-CM

## 2025-07-11 PROCEDURE — 93296 REM INTERROG EVL PM/IDS: CPT

## 2025-08-08 ENCOUNTER — HOSPITAL ENCOUNTER (EMERGENCY)
Facility: HOSPITAL | Age: 55
Discharge: HOME | End: 2025-08-08
Attending: EMERGENCY MEDICINE
Payer: COMMERCIAL

## 2025-08-08 ENCOUNTER — APPOINTMENT (OUTPATIENT)
Dept: RADIOLOGY | Facility: HOSPITAL | Age: 55
End: 2025-08-08
Payer: COMMERCIAL

## 2025-08-08 ENCOUNTER — TELEPHONE (OUTPATIENT)
Dept: PRIMARY CARE | Facility: CLINIC | Age: 55
End: 2025-08-08
Payer: COMMERCIAL

## 2025-08-08 VITALS
OXYGEN SATURATION: 99 % | WEIGHT: 200 LBS | TEMPERATURE: 96.4 F | HEIGHT: 70 IN | RESPIRATION RATE: 16 BRPM | HEART RATE: 79 BPM | BODY MASS INDEX: 28.63 KG/M2 | SYSTOLIC BLOOD PRESSURE: 135 MMHG | DIASTOLIC BLOOD PRESSURE: 86 MMHG

## 2025-08-08 DIAGNOSIS — R10.31 RIGHT LOWER QUADRANT ABDOMINAL PAIN: Primary | ICD-10-CM

## 2025-08-08 LAB
ALBUMIN SERPL BCP-MCNC: 4.6 G/DL (ref 3.4–5)
ALP SERPL-CCNC: 49 U/L (ref 33–120)
ALT SERPL W P-5'-P-CCNC: 14 U/L (ref 10–52)
ANION GAP SERPL CALC-SCNC: 11 MMOL/L (ref 10–20)
APPEARANCE UR: CLEAR
APTT PPP: 33 SECONDS (ref 26–36)
AST SERPL W P-5'-P-CCNC: 21 U/L (ref 9–39)
BASOPHILS # BLD AUTO: 0.07 X10*3/UL (ref 0–0.1)
BASOPHILS NFR BLD AUTO: 0.8 %
BILIRUB SERPL-MCNC: 0.6 MG/DL (ref 0–1.2)
BILIRUB UR STRIP.AUTO-MCNC: NEGATIVE MG/DL
BUN SERPL-MCNC: 17 MG/DL (ref 6–23)
CALCIUM SERPL-MCNC: 9.3 MG/DL (ref 8.6–10.3)
CHLORIDE SERPL-SCNC: 107 MMOL/L (ref 98–107)
CO2 SERPL-SCNC: 24 MMOL/L (ref 21–32)
COLOR UR: NORMAL
CREAT SERPL-MCNC: 0.99 MG/DL (ref 0.5–1.3)
EGFRCR SERPLBLD CKD-EPI 2021: 90 ML/MIN/1.73M*2
EOSINOPHIL # BLD AUTO: 0.09 X10*3/UL (ref 0–0.7)
EOSINOPHIL NFR BLD AUTO: 1.1 %
ERYTHROCYTE [DISTWIDTH] IN BLOOD BY AUTOMATED COUNT: 13.3 % (ref 11.5–14.5)
GLUCOSE SERPL-MCNC: 99 MG/DL (ref 74–99)
GLUCOSE UR STRIP.AUTO-MCNC: NORMAL MG/DL
HCT VFR BLD AUTO: 43.1 % (ref 41–52)
HGB BLD-MCNC: 14.9 G/DL (ref 13.5–17.5)
HOLD SPECIMEN: NORMAL
HOLD SPECIMEN: NORMAL
IMM GRANULOCYTES # BLD AUTO: 0.02 X10*3/UL (ref 0–0.7)
IMM GRANULOCYTES NFR BLD AUTO: 0.2 % (ref 0–0.9)
INR PPP: 1.2 (ref 0.9–1.1)
KETONES UR STRIP.AUTO-MCNC: NEGATIVE MG/DL
LEUKOCYTE ESTERASE UR QL STRIP.AUTO: NEGATIVE
LYMPHOCYTES # BLD AUTO: 1.53 X10*3/UL (ref 1.2–4.8)
LYMPHOCYTES NFR BLD AUTO: 18.1 %
MCH RBC QN AUTO: 35.6 PG (ref 26–34)
MCHC RBC AUTO-ENTMCNC: 34.6 G/DL (ref 32–36)
MCV RBC AUTO: 103 FL (ref 80–100)
MONOCYTES # BLD AUTO: 0.73 X10*3/UL (ref 0.1–1)
MONOCYTES NFR BLD AUTO: 8.6 %
NEUTROPHILS # BLD AUTO: 6 X10*3/UL (ref 1.2–7.7)
NEUTROPHILS NFR BLD AUTO: 71.2 %
NITRITE UR QL STRIP.AUTO: NEGATIVE
NRBC BLD-RTO: 0 /100 WBCS (ref 0–0)
PH UR STRIP.AUTO: 7 [PH]
PLATELET # BLD AUTO: 210 X10*3/UL (ref 150–450)
POTASSIUM SERPL-SCNC: 3.9 MMOL/L (ref 3.5–5.3)
PROT SERPL-MCNC: 7 G/DL (ref 6.4–8.2)
PROT UR STRIP.AUTO-MCNC: NEGATIVE MG/DL
PROTHROMBIN TIME: 13.5 SECONDS (ref 9.8–12.4)
RBC # BLD AUTO: 4.19 X10*6/UL (ref 4.5–5.9)
RBC # UR STRIP.AUTO: NEGATIVE MG/DL
SODIUM SERPL-SCNC: 138 MMOL/L (ref 136–145)
SP GR UR STRIP.AUTO: 1.02
UROBILINOGEN UR STRIP.AUTO-MCNC: NORMAL MG/DL
WBC # BLD AUTO: 8.4 X10*3/UL (ref 4.4–11.3)

## 2025-08-08 PROCEDURE — 96374 THER/PROPH/DIAG INJ IV PUSH: CPT

## 2025-08-08 PROCEDURE — 93975 VASCULAR STUDY: CPT

## 2025-08-08 PROCEDURE — 93976 VASCULAR STUDY: CPT | Performed by: STUDENT IN AN ORGANIZED HEALTH CARE EDUCATION/TRAINING PROGRAM

## 2025-08-08 PROCEDURE — 81003 URINALYSIS AUTO W/O SCOPE: CPT | Performed by: EMERGENCY MEDICINE

## 2025-08-08 PROCEDURE — 74176 CT ABD & PELVIS W/O CONTRAST: CPT

## 2025-08-08 PROCEDURE — 2500000004 HC RX 250 GENERAL PHARMACY W/ HCPCS (ALT 636 FOR OP/ED): Mod: JZ | Performed by: EMERGENCY MEDICINE

## 2025-08-08 PROCEDURE — 99285 EMERGENCY DEPT VISIT HI MDM: CPT | Mod: 25 | Performed by: EMERGENCY MEDICINE

## 2025-08-08 PROCEDURE — 80053 COMPREHEN METABOLIC PANEL: CPT | Performed by: EMERGENCY MEDICINE

## 2025-08-08 PROCEDURE — 96375 TX/PRO/DX INJ NEW DRUG ADDON: CPT

## 2025-08-08 PROCEDURE — 85025 COMPLETE CBC W/AUTO DIFF WBC: CPT | Performed by: EMERGENCY MEDICINE

## 2025-08-08 PROCEDURE — 76870 US EXAM SCROTUM: CPT | Performed by: STUDENT IN AN ORGANIZED HEALTH CARE EDUCATION/TRAINING PROGRAM

## 2025-08-08 PROCEDURE — 96361 HYDRATE IV INFUSION ADD-ON: CPT

## 2025-08-08 PROCEDURE — 74176 CT ABD & PELVIS W/O CONTRAST: CPT | Performed by: STUDENT IN AN ORGANIZED HEALTH CARE EDUCATION/TRAINING PROGRAM

## 2025-08-08 PROCEDURE — 85730 THROMBOPLASTIN TIME PARTIAL: CPT | Performed by: EMERGENCY MEDICINE

## 2025-08-08 PROCEDURE — 85610 PROTHROMBIN TIME: CPT | Performed by: EMERGENCY MEDICINE

## 2025-08-08 PROCEDURE — 36415 COLL VENOUS BLD VENIPUNCTURE: CPT | Performed by: EMERGENCY MEDICINE

## 2025-08-08 RX ORDER — OXYCODONE AND ACETAMINOPHEN 5; 325 MG/1; MG/1
1 TABLET ORAL EVERY 6 HOURS PRN
Qty: 8 TABLET | Refills: 0 | Status: SHIPPED | OUTPATIENT
Start: 2025-08-08 | End: 2025-08-11

## 2025-08-08 RX ORDER — MORPHINE SULFATE 4 MG/ML
4 INJECTION, SOLUTION INTRAMUSCULAR; INTRAVENOUS ONCE
Status: COMPLETED | OUTPATIENT
Start: 2025-08-08 | End: 2025-08-08

## 2025-08-08 RX ORDER — HYDROMORPHONE HYDROCHLORIDE 1 MG/ML
1 INJECTION, SOLUTION INTRAMUSCULAR; INTRAVENOUS; SUBCUTANEOUS ONCE
Status: COMPLETED | OUTPATIENT
Start: 2025-08-08 | End: 2025-08-08

## 2025-08-08 RX ORDER — ONDANSETRON HYDROCHLORIDE 2 MG/ML
4 INJECTION, SOLUTION INTRAVENOUS ONCE
Status: COMPLETED | OUTPATIENT
Start: 2025-08-08 | End: 2025-08-08

## 2025-08-08 RX ADMIN — ONDANSETRON 4 MG: 2 INJECTION INTRAMUSCULAR; INTRAVENOUS at 11:30

## 2025-08-08 RX ADMIN — HYDROMORPHONE HYDROCHLORIDE 1 MG: 1 INJECTION, SOLUTION INTRAMUSCULAR; INTRAVENOUS; SUBCUTANEOUS at 11:30

## 2025-08-08 RX ADMIN — MORPHINE SULFATE 4 MG: 4 INJECTION, SOLUTION INTRAMUSCULAR; INTRAVENOUS at 13:52

## 2025-08-08 RX ADMIN — SODIUM CHLORIDE 500 ML: 0.9 INJECTION, SOLUTION INTRAVENOUS at 11:30

## 2025-08-08 ASSESSMENT — PAIN DESCRIPTION - ONSET: ONSET: ONGOING

## 2025-08-08 ASSESSMENT — LIFESTYLE VARIABLES
HAVE PEOPLE ANNOYED YOU BY CRITICIZING YOUR DRINKING: NO
EVER HAD A DRINK FIRST THING IN THE MORNING TO STEADY YOUR NERVES TO GET RID OF A HANGOVER: NO
EVER FELT BAD OR GUILTY ABOUT YOUR DRINKING: NO
HAVE YOU EVER FELT YOU SHOULD CUT DOWN ON YOUR DRINKING: NO
TOTAL SCORE: 0

## 2025-08-08 ASSESSMENT — PAIN DESCRIPTION - FREQUENCY: FREQUENCY: CONSTANT/CONTINUOUS

## 2025-08-08 ASSESSMENT — PAIN DESCRIPTION - DESCRIPTORS: DESCRIPTORS: ACHING;SQUEEZING;THROBBING

## 2025-08-08 ASSESSMENT — PAIN SCALES - GENERAL
PAINLEVEL_OUTOF10: 9
PAINLEVEL_OUTOF10: 8

## 2025-08-08 ASSESSMENT — PAIN DESCRIPTION - LOCATION
LOCATION: ABDOMEN
LOCATION: SCROTUM

## 2025-08-08 ASSESSMENT — PAIN - FUNCTIONAL ASSESSMENT
PAIN_FUNCTIONAL_ASSESSMENT: 0-10
PAIN_FUNCTIONAL_ASSESSMENT: 0-10

## 2025-08-08 ASSESSMENT — PAIN DESCRIPTION - PAIN TYPE
TYPE: ACUTE PAIN
TYPE: ACUTE PAIN

## 2025-08-08 ASSESSMENT — PAIN DESCRIPTION - ORIENTATION: ORIENTATION: RIGHT;LEFT

## 2025-08-08 ASSESSMENT — PAIN DESCRIPTION - PROGRESSION: CLINICAL_PROGRESSION: NOT CHANGED

## 2025-08-08 NOTE — ED PROVIDER NOTES
Emergency Department Provider Note       History of Present Illness     History provided by: Patient  Limitations to History: None  External Records Reviewed with Brief Summary: None    HPI:  Hira Mojica is a 55 y.o. male with past medical history of prior inguinal hernia repair on the right side is present with complaint of worsening right lower quadrant pain rating down to the right testicle.  Acute onset.  Positive diaphoresis. nausea but no vomiting.  No diarrhea.  Does report a prior history of kidney stones.  No prior history ported of diverticulitis.    Physical Exam   Triage vitals:  T 36.6 °C (97.9 °F)  HR 82  BP (!) 155/107  RR 18  O2 97 % None (Room air)    Physical Exam  Vitals and nursing note reviewed.   Constitutional:       Appearance: Normal appearance. He is normal weight.   HENT:      Head: Normocephalic and atraumatic.      Nose: Nose normal.      Mouth/Throat:      Mouth: Mucous membranes are moist.      Pharynx: Oropharynx is clear.     Eyes:      Extraocular Movements: Extraocular movements intact.      Conjunctiva/sclera: Conjunctivae normal.      Pupils: Pupils are equal, round, and reactive to light.       Cardiovascular:      Rate and Rhythm: Normal rate and regular rhythm.      Pulses: Normal pulses.      Heart sounds: Normal heart sounds.   Pulmonary:      Effort: Pulmonary effort is normal.      Breath sounds: Normal breath sounds.   Abdominal:      General: Abdomen is flat. Bowel sounds are normal. There is no distension.      Tenderness: There is abdominal tenderness in the right lower quadrant. There is no right CVA tenderness, left CVA tenderness, guarding or rebound.   Genitourinary:     Testes: Normal. Cremasteric reflex is present.         Right: Mass, tenderness or swelling not present.         Left: Mass, tenderness or swelling not present.     Musculoskeletal:         General: Normal range of motion.      Cervical back: Normal range of motion and neck supple.      Skin:     General: Skin is warm and dry.      Capillary Refill: Capillary refill takes less than 2 seconds.     Neurological:      General: No focal deficit present.      Mental Status: He is alert and oriented to person, place, and time. Mental status is at baseline.      Sensory: No sensory deficit.      Motor: No weakness.     Psychiatric:         Mood and Affect: Mood normal.         Behavior: Behavior normal.         Thought Content: Thought content normal.         Judgment: Judgment normal.           Medical Decision Making & ED Course   Medical Decision Makin y.o. male here with right lower quadrant abdominal pain.  IV labs and CT scan were obtained.  There was no acute kidney injury.  No metabolic anion gap acidosis.  No electrolyte abnormalities.  No elevated white blood cell count systemic infection.  UA was negative for infection.  CT scan of the abdomen pelvis was negative for acute pathology.  Did show some fat-containing inguinal hernias.  Given testicular pain reported though his exam is fairly benign clinically I did send him for an ultrasound which again was negative for acute pathology. No torsion.  No epididymitis.  I did review the imaging with Dr. Hand who did agree this was no acute pathology and the hernias themselves did not require any acute surgical intervention.  Pain control be provided plan to follow-up with Dr. Cardoso as outpatient.  Turn precautions are discussed.  ----      Differential diagnoses considered include but are not limited to: Diverticulitis, kidney stone, appendicitis, torsion    Social Determinants of Health which Significantly Impact Care: Social Determinants of Health which Significantly Impact Care: None identified     EKG Independent Interpretation: EKG not obtained    Independent Result Review and Interpretation: Relevant laboratory and radiographic results were reviewed and independently interpreted by myself.  As necessary, they are commented on in the  ED Course.    Chronic conditions affecting the patient's care: As documented above in [facility]    The patient was discussed with the following consultants/services: None    Care Considerations: As documented above in [facility]    ED Course:  Diagnoses as of 08/08/25 1600   Right lower quadrant abdominal pain       Disposition   As a result of the work-up, the patient was discharged home.  he was informed of his diagnosis and instructed to come back with any concerns or worsening of condition.  he and was agreeable to the plan as discussed above.  he was given the opportunity to ask questions.  All of the patient's questions were answered.    Procedures   Procedures        Ho Canales MD  Emergency Medicine                                                       Ho Canales MD  08/08/25 1552       Ho Canales MD  08/08/25 1600

## 2025-08-08 NOTE — DISCHARGE INSTRUCTIONS
Use the Percocet as needed for breakthrough abdominal pain.  Please follow-up with Dr. Cardoso in clinic in the next 1 to 2 days to continue care for your hernias.  Please return ED for worsening pain, fever, vomiting or any other concerning symptoms.

## 2025-08-08 NOTE — TELEPHONE ENCOUNTER
Patient called symptoms of being very jittery, having diarrhea, nausea, and being sweaty. This started at around 4 am this morning.  Dr. Lujan advised that patient should go to ER. I relayed this to the patient.

## 2025-08-11 LAB
HOLD SPECIMEN: NORMAL

## 2025-09-05 ENCOUNTER — HOSPITAL ENCOUNTER (OUTPATIENT)
Dept: CARDIOLOGY | Facility: HOSPITAL | Age: 55
Discharge: HOME | End: 2025-09-05
Payer: COMMERCIAL

## 2025-09-05 DIAGNOSIS — Z95.810 PRESENCE OF AUTOMATIC CARDIOVERTER/DEFIBRILLATOR (AICD): ICD-10-CM

## 2025-09-05 DIAGNOSIS — I47.20 VENTRICULAR TACHYCARDIA (PAROXYSMAL): ICD-10-CM

## 2025-09-09 ENCOUNTER — APPOINTMENT (OUTPATIENT)
Dept: CARDIOLOGY | Facility: CLINIC | Age: 55
End: 2025-09-09
Payer: COMMERCIAL

## 2025-10-23 ENCOUNTER — APPOINTMENT (OUTPATIENT)
Dept: CARDIOLOGY | Facility: CLINIC | Age: 55
End: 2025-10-23
Payer: COMMERCIAL

## (undated) DEVICE — Device

## (undated) DEVICE — GUIDEWIRE, RUN THROUGH WIRE, 180CM

## (undated) DEVICE — CATHETER, DIAGNOSTIC, AMPLATZ, 5 FR, AL 2

## (undated) DEVICE — TUBING, MANIFOLD, LOW PRESSURE

## (undated) DEVICE — BAND, VASCULAR, RADIAL HEMOSTAT, REGULAR 24CM

## (undated) DEVICE — SHEATH, GLIDESHEATH, SLENDER, 6FR 10CM

## (undated) DEVICE — GUIDEWIRE, TIGERWIRE, FLOPPY, ANGLED, 150CM

## (undated) DEVICE — CATHETER, BALLOON DILATION, EUPHORA SEMICOMPLIANT 2.0  X 15 MM X 142CM

## (undated) DEVICE — INFLATION DEVICE, COPILOT VALVE AND 20/30 INDEFLATOR

## (undated) DEVICE — CATHETER, GUIDING, VISTA BRITE 6FR, XBRCA 100CM

## (undated) DEVICE — CATHETER, OPTITORQUE, 5FR, JACKY, 3.5/ 2H/110CM, CURVED